# Patient Record
Sex: FEMALE | Race: WHITE | Employment: OTHER | URBAN - NONMETROPOLITAN AREA
[De-identification: names, ages, dates, MRNs, and addresses within clinical notes are randomized per-mention and may not be internally consistent; named-entity substitution may affect disease eponyms.]

---

## 2018-02-21 ENCOUNTER — HOSPITAL ENCOUNTER (OUTPATIENT)
Dept: LAB | Age: 53
Discharge: HOME | End: 2018-02-21
Attending: FAMILY MEDICINE | Admitting: FAMILY MEDICINE

## 2018-02-21 LAB
ALBUMIN SERPL-MCNC: 3.9 GM/DL (ref 3.4–5)
ALP BLD-CCNC: 85 IU/L (ref 40–129)
ALT SERPL-CCNC: 17 U/L (ref 10–40)
ANION GAP SERPL CALCULATED.3IONS-SCNC: 10 MMOL/L (ref 4–16)
AST SERPL-CCNC: 17 IU/L (ref 15–37)
BILIRUB SERPL-MCNC: 0.4 MG/DL (ref 0–1)
BUN BLDV-MCNC: 19 MG/DL (ref 6–23)
CALCIUM SERPL-MCNC: 9.6 MG/DL (ref 8.3–10.6)
CHLORIDE BLD-SCNC: 98 MMOL/L (ref 99–110)
CO2: 31 MMOL/L (ref 21–32)
CREAT SERPL-MCNC: 0.6 MG/DL (ref 0.6–1.1)
GFR AFRICAN AMERICAN: >60 ML/MIN/1.73M2
GFR NON-AFRICAN AMERICAN: >60 ML/MIN/1.73M2
GLUCOSE FASTING: 101 MG/DL (ref 70–99)
POTASSIUM SERPL-SCNC: 3.7 MMOL/L (ref 3.5–5.1)
SODIUM BLD-SCNC: 139 MMOL/L (ref 135–145)
TOTAL PROTEIN: 7.8 GM/DL (ref 6.4–8.2)
TSH HIGH SENSITIVITY: 0.19 UIU/ML (ref 0.27–4.2)

## 2018-02-22 LAB
T3 UPTAKE PERCENT: 39
T4 TOTAL: 9.29 UG/DL

## 2018-02-25 LAB
VITAMIN D2 AND D3, TOTAL: 23.6
VITAMIN D2, 25 HYDROXY: <1
VITAMIN D3,25 HYDROXY: 23.6

## 2019-01-23 ENCOUNTER — OFFICE VISIT (OUTPATIENT)
Dept: ORTHOPEDIC SURGERY | Age: 54
End: 2019-01-23
Payer: COMMERCIAL

## 2019-01-23 VITALS
RESPIRATION RATE: 14 BRPM | OXYGEN SATURATION: 95 % | BODY MASS INDEX: 41.15 KG/M2 | WEIGHT: 247 LBS | HEIGHT: 65 IN | HEART RATE: 105 BPM

## 2019-01-23 DIAGNOSIS — R52 PAIN: ICD-10-CM

## 2019-01-23 DIAGNOSIS — M17.11 PRIMARY OSTEOARTHRITIS OF RIGHT KNEE: Primary | ICD-10-CM

## 2019-01-23 PROCEDURE — 99203 OFFICE O/P NEW LOW 30 MIN: CPT | Performed by: ORTHOPAEDIC SURGERY

## 2019-01-23 PROCEDURE — 20610 DRAIN/INJ JOINT/BURSA W/O US: CPT | Performed by: ORTHOPAEDIC SURGERY

## 2019-01-23 ASSESSMENT — ENCOUNTER SYMPTOMS
COLOR CHANGE: 0
BACK PAIN: 0

## 2019-03-06 ENCOUNTER — OFFICE VISIT (OUTPATIENT)
Dept: ORTHOPEDIC SURGERY | Age: 54
End: 2019-03-06
Payer: COMMERCIAL

## 2019-03-06 VITALS — WEIGHT: 245 LBS | BODY MASS INDEX: 46.26 KG/M2 | RESPIRATION RATE: 16 BRPM | HEIGHT: 61 IN

## 2019-03-06 DIAGNOSIS — M17.12 PRIMARY OSTEOARTHRITIS OF LEFT KNEE: Primary | ICD-10-CM

## 2019-03-06 PROCEDURE — G8427 DOCREV CUR MEDS BY ELIG CLIN: HCPCS | Performed by: ORTHOPAEDIC SURGERY

## 2019-03-06 PROCEDURE — 4004F PT TOBACCO SCREEN RCVD TLK: CPT | Performed by: ORTHOPAEDIC SURGERY

## 2019-03-06 PROCEDURE — 99212 OFFICE O/P EST SF 10 MIN: CPT | Performed by: ORTHOPAEDIC SURGERY

## 2019-03-06 PROCEDURE — G8484 FLU IMMUNIZE NO ADMIN: HCPCS | Performed by: ORTHOPAEDIC SURGERY

## 2019-03-06 PROCEDURE — 3017F COLORECTAL CA SCREEN DOC REV: CPT | Performed by: ORTHOPAEDIC SURGERY

## 2019-03-06 PROCEDURE — G8417 CALC BMI ABV UP PARAM F/U: HCPCS | Performed by: ORTHOPAEDIC SURGERY

## 2019-03-06 ASSESSMENT — ENCOUNTER SYMPTOMS
COLOR CHANGE: 0
BACK PAIN: 0

## 2019-03-13 ENCOUNTER — TELEPHONE (OUTPATIENT)
Dept: ORTHOPEDIC SURGERY | Age: 54
End: 2019-03-13

## 2019-06-05 ENCOUNTER — OFFICE VISIT (OUTPATIENT)
Dept: ORTHOPEDIC SURGERY | Age: 54
End: 2019-06-05
Payer: COMMERCIAL

## 2019-06-05 DIAGNOSIS — M17.12 PRIMARY OSTEOARTHRITIS OF LEFT KNEE: Primary | ICD-10-CM

## 2019-06-05 PROCEDURE — 4004F PT TOBACCO SCREEN RCVD TLK: CPT | Performed by: ORTHOPAEDIC SURGERY

## 2019-06-05 PROCEDURE — G8417 CALC BMI ABV UP PARAM F/U: HCPCS | Performed by: ORTHOPAEDIC SURGERY

## 2019-06-05 PROCEDURE — 3017F COLORECTAL CA SCREEN DOC REV: CPT | Performed by: ORTHOPAEDIC SURGERY

## 2019-06-05 PROCEDURE — 99213 OFFICE O/P EST LOW 20 MIN: CPT | Performed by: ORTHOPAEDIC SURGERY

## 2019-06-05 PROCEDURE — G8428 CUR MEDS NOT DOCUMENT: HCPCS | Performed by: ORTHOPAEDIC SURGERY

## 2019-06-05 PROCEDURE — 20610 DRAIN/INJ JOINT/BURSA W/O US: CPT | Performed by: ORTHOPAEDIC SURGERY

## 2019-06-05 ASSESSMENT — ENCOUNTER SYMPTOMS
COLOR CHANGE: 0
BACK PAIN: 0

## 2019-06-05 NOTE — PATIENT INSTRUCTIONS
Patient Education        Knee Arthritis: Exercises  Your Care Instructions  Here are some examples of exercises for knee arthritis. Start each exercise slowly. Ease off the exercise if you start to have pain. Your doctor or physical therapist will tell you when you can start these exercises and which ones will work best for you. How to do the exercises  Knee flexion with heel slide    1. Lie on your back with your knees bent. 2. Slide your heel back by bending your affected knee as far as you can. Then hook your other foot around your ankle to help pull your heel even farther back. 3. Hold for about 6 seconds, then rest for up to 10 seconds. 4. Repeat 8 to 12 times. 5. Switch legs and repeat steps 1 through 4, even if only one knee is sore. Quad sets    1. Sit with your affected leg straight and supported on the floor or a firm bed. Place a small, rolled-up towel under your knee. Your other leg should be bent, with that foot flat on the floor. 2. Tighten the thigh muscles of your affected leg by pressing the back of your knee down into the towel. 3. Hold for about 6 seconds, then rest for up to 10 seconds. 4. Repeat 8 to 12 times. 5. Switch legs and repeat steps 1 through 4, even if only one knee is sore. Straight-leg raises to the front    1. Lie on your back with your good knee bent so that your foot rests flat on the floor. Your affected leg should be straight. Make sure that your low back has a normal curve. You should be able to slip your hand in between the floor and the small of your back, with your palm touching the floor and your back touching the back of your hand. 2. Tighten the thigh muscles in your affected leg by pressing the back of your knee flat down to the floor. Hold your knee straight. 3. Keeping the thigh muscles tight and your leg straight, lift your affected leg up so that your heel is about 12 inches off the floor. Hold for about 6 seconds, then lower slowly.   4. Relax for up to 10 seconds between repetitions. 5. Repeat 8 to 12 times. 6. Switch legs and repeat steps 1 through 5, even if only one knee is sore. Active knee flexion    1. Lie on your stomach with your knees straight. If your kneecap is uncomfortable, roll up a washcloth and put it under your leg just above your kneecap. 2. Lift the foot of your affected leg by bending the knee so that you bring the foot up toward your buttock. If this motion hurts, try it without bending your knee quite as far. This may help you avoid any painful motion. 3. Slowly move your leg up and down. 4. Repeat 8 to 12 times. 5. Switch legs and repeat steps 1 through 4, even if only one knee is sore. Quadriceps stretch (facedown)    1. Lie flat on your stomach, and rest your face on the floor. 2. Wrap a towel or belt strap around the lower part of your affected leg. Then use the towel or belt strap to slowly pull your heel toward your buttock until you feel a stretch. 3. Hold for about 15 to 30 seconds, then relax your leg against the towel or belt strap. 4. Repeat 2 to 4 times. 5. Switch legs and repeat steps 1 through 4, even if only one knee is sore. Stationary exercise bike    1. If you do not have a stationary exercise bike at home, you can find one to ride at your local health club or community center. 2. Adjust the height of the bike seat so that your knee is slightly bent when your leg is extended downward. If your knee hurts when the pedal reaches the top, you can raise the seat so that your knee does not bend as much. 3. Start slowly. At first, try to do 5 to 10 minutes of cycling with little to no resistance. Then increase your time and the resistance bit by bit until you can do 20 to 30 minutes without pain. 4. If you start to have pain, rest your knee until your pain gets back to the level that is normal for you. Or cycle for less time or with less effort.     Follow-up care is a key part of your treatment and safety. Be sure to make and go to all appointments, and call your doctor if you are having problems. It's also a good idea to know your test results and keep a list of the medicines you take. Where can you learn more? Go to https://chfifieb.Evolver. org and sign in to your [employer] account. Enter C159 in the O-film box to learn more about \"Knee Arthritis: Exercises. \"     If you do not have an account, please click on the \"Sign Up Now\" link. Current as of: September 20, 2018  Content Version: 12.0  © 4886-4265 [employer], [employer]. Care instructions adapted under license by [facility] ([facility]). If you have questions about a medical condition or this instruction, always ask your healthcare professional. [address] any warranty or liability for your use of this information.        steroid injection left knee   follow up in 6 weeks

## 2019-06-05 NOTE — PROGRESS NOTES
Patient is still having left knee pain. She states the cortisone injection in January only helped a couple days. Pain is worse in the first steps of the morning and last steps of the day. She complains of popping and crackling. She has some locking. She has tried ibuprofen (nsaids) for about 7 months, she has tried salon pas, biofreeze, heat, ice and epsom salt baths. She has done home exercises. She does have stairs at home that are painful to go up. She has not had any prior surgeries or fractures in the left leg.

## 2019-06-05 NOTE — PROGRESS NOTES
Subjective:      Patient ID: Lieutenant Davis is a 53 y.o. female. Patient is still having left knee pain. She states the cortisone injection in January only helped a couple days. Pain is worse in the first steps of the morning and last steps of the day. She complains of popping and crackling. She has some locking. She has tried ibuprofen (nsaids) for about 7 months, she has tried salon pas, biofreeze, heat, ice and epsom salt baths. She has done home exercises. She does have stairs at home that are painful to go up. She has not had any prior surgeries or fractures in the left leg. She comes in today for recheck of her left knee pain and follow-up after cortisone injection. She states that since the last injection she has continued to have deep, aching and grinding pain globally in her left knee. She states that the joint injection only helped her for a couple of weeks. She continues to have deep, aching and grinding pain globally in her left knee. Patient denies any new injury to the involved extremity/ joint, denies numbness or tingling in the involved extremity and denies fever or chills. The request for the Orthovisc injections was denied. Knee Pain    Pertinent negatives include no numbness. Review of Systems   Constitutional: Negative for activity change, chills and fever. Musculoskeletal: Positive for arthralgias, gait problem, joint swelling and myalgias. Negative for back pain. Skin: Negative for color change, pallor, rash and wound. Neurological: Negative for weakness and numbness. Objective:   Physical Exam   Constitutional: She is oriented to person, place, and time. Vital signs are normal. She appears well-developed and well-nourished. HENT:   Head: Normocephalic and atraumatic. Eyes: Pupils are equal, round, and reactive to light. Neck: Normal range of motion. Musculoskeletal: Normal range of motion. She exhibits edema and tenderness.  She exhibits no deformity. Right hip: Normal.        Left hip: Normal.        Right knee: She exhibits normal range of motion, no swelling, no effusion, no ecchymosis, no deformity, no laceration, no erythema, normal alignment, no LCL laxity, normal patellar mobility, no bony tenderness and no MCL laxity. No tenderness found. No medial joint line, no lateral joint line, no MCL, no LCL and no patellar tendon tenderness noted. Left knee: She exhibits swelling, effusion, bony tenderness and abnormal meniscus. She exhibits normal range of motion, no ecchymosis, no deformity, no laceration, no erythema, normal alignment, no LCL laxity, normal patellar mobility and no MCL laxity. Tenderness found. Medial joint line and lateral joint line tenderness noted. No MCL, no LCL and no patellar tendon tenderness noted. Neurological: She is alert and oriented to person, place, and time. She has normal strength. No sensory deficit. Skin: Skin is warm and dry. No rash noted. No erythema. No pallor. Left knee-skin intact with no erythema, ecchymosis or lacerations present. Mild joint effusion. Positive Babatunde sign in the medial compartment of the left knee. XRAY  X-ray 3 view of the Left knee obtained and reviewed by me today in the office demonstrates age-appropriate bone density throughout, moderate to severe degenerative changes with medial joint space collapse and moderate patellofemoral joint space narrowing, normal tracking of the patella, no acute osseous abnormalities, no bony prominences, no loose bodies. Assessment:      Left knee DJD, moderate to severe      Plan:      I discussed with her today her response to the cortisone injection for her left knee. At this point given her persistent symptoms the next step would be to consider Orthovisc injections for her left knee. At this point we will attempt an additional cortisone injection today. She agrees and would like to proceed with the injection.   I will also get her set up for some aquatic physical therapy. If these ever quit working in the last resort would be to consider knee replacement surgery. Continue weight-bearing as tolerated. Continue range of motion exercises as instructed. Ice and elevate as needed. Tylenol or Motrin for pain. Follow-up in 6 weeks for recheck. Knee Injection Procedure Note    Pre-operative Diagnosis: left knee DJD    Post-operative Diagnosis: same    Indications: Symptom relief from osteoarthritis    Anesthesia: Lidocaine 1% and marcaine 0.5% without epinephrine without added sodium bicarbonate    Procedure Details     Verbal consent was obtained for the procedure. The joint was prepped with alcohol. A 22 gauge needle was inserted into the superior aspect of the joint from a lateral approach. 1 ml 1% lidocaine and 1 ml 0.5% marcaine and 1 ml of triamcinolone (KENALOG) 40mg/ml was then injected into the joint through the same needle. The needle was removed and the area cleansed and dressed. Complications:  None; patient tolerated the procedure well. Pain improved immediately after injection.               Brendan 97, DO

## 2019-06-26 ENCOUNTER — HOSPITAL ENCOUNTER (OUTPATIENT)
Dept: PHYSICAL THERAPY | Age: 54
Discharge: HOME | End: 2019-06-26

## 2019-06-27 ENCOUNTER — HOSPITAL ENCOUNTER (OUTPATIENT)
Dept: PHYSICAL THERAPY | Age: 54
Setting detail: THERAPIES SERIES
Discharge: HOME | End: 2019-06-27
Payer: COMMERCIAL

## 2019-06-27 PROCEDURE — 97140 MANUAL THERAPY 1/> REGIONS: CPT

## 2019-06-27 PROCEDURE — 97110 THERAPEUTIC EXERCISES: CPT

## 2019-06-27 PROCEDURE — 97162 PT EVAL MOD COMPLEX 30 MIN: CPT

## 2019-06-27 NOTE — PLAN OF CARE
Outpatient Physical Therapy        [x] Phone: [phone]   Fax: [phone]   Pediatric Therapy          [] Phone: [phone]   Fax: [phone]  Pediatric [facility]          [] Phone: [phone]   Fax: [phone]      To: Referring Practitioner: Dr. Rashawn Hodge    From: [facility], PT     Patient: Jazmine Leong       : 1965  Diagnosis: L knee OA   Treatment Diagnosis: Knee DJD/OA   Date: 2019    Physical Therapy Certification/Re-Certification Form  Dear Dr. Rashawn Hodge  The following patient has been evaluated for physical therapy services and for therapy to continue, Please review the attached evaluation and/or summary of the patient's plan of care, and verify that you agree therapy should continue by signing the attached document and sending it back to our office. Patient is a  54 yo female who presents with L knee pain which impacts on adls;patient's goal is to decrease pain ;patient reports that pain limits activities including standing, walking, steps, sleeping; PT to address patient's goals, impairments and activity limitations with skilled interventions checked in plan of care;patient's level of function prior to onset of pain was independent; did not observe any barriers to learning during PT eval; learning preferences include demonstration, practice, and handouts; patient expressed understanding of HEP; patient appears to be motivated to participate in an active PT program and to be compliant with HEP expectations;patient assisted in developing treatment plan and goals; no DME is currently being used;      Current functional level (based on LEFS )   :    Assessment:  Pt relates history of chronic pains, knee onset in 2018 when descending and pivoting on steps. She relates 2 steroid injections have not brought relief, Orthovisc denied at this time. She presents with pain limited ROM and strength. Tenderness at trochanter, medial joint line, ITB, pes anserine.   Palpable crepitus at knees      Plan of Care/Treatment to date:  [x] Therapeutic Exercise    [x] Aquatics:  [x] Therapeutic Activity    [] Ultrasound  [] Elec Stimulation  [x] Gait Training     [] Cervical Traction [] Lumbar Traction  [x] Neuromuscular Re-education [] Cold/hotpack [] Iontophoresis   [x] Instruction in HEP       [x] Manual Therapy     [] vasopneumatic            [] Self care home management        [x]Dry needling trigger point point/pain management    ? Frequency/Duration:  # Days per week: [] 1 day # Weeks: [] 1 week [] 5 weeks     [x] 2 days? [] 2 weeks [x] 6 weeks     [] 3 days   [] 3 weeks [] 7 weeks     [] 4 days   [] 4 weeks [] 8 weeks    Rehab Potential/Progress: [] Excellent [x] Good [] Fair  [] Poor     Goals:       Long term goals  Time Frame for Long term goals : 6 weeks  Long term goal 1: I in home program.  Long term goal 2: up/down one floor of steps with minimal pain. Long term goal 3: knee arom 0-120  Long term goal 4: mmt 4+ or better L knee flex, extension. Long term goal 5: stand/walk 1 hr with manageable pain. Electronically signed by:  Kashmir Eli PT, 6/27/2019, 10:24 AM              If you have any questions or concerns, please don't hesitate to call.   Thank you for your referral.      Physician Signature:_________________Date:____________Time: ________  By signing above, therapists plan is approved by physician

## 2019-06-27 NOTE — PROGRESS NOTES
Physical Therapy  Initial Assessment  Date: 2019  Patient Name: Jamila Luo  MRN: [id number]  : 1965     Treatment Diagnosis: Knee DJD/OA    Restrictions       Subjective   General  Additional Pertinent Hx: L knee pain has progressively worsened since 2018 going down steps. 2 steroid injections without lasting benefit. Orthovisc denied. has tried numerous topicals without benefit. Ibuprofin and herbals  Referring Practitioner: Dr. Breana Dalal  Referral Date : 19  Diagnosis: L knee OA  PT Visit Information  PT Insurance Information: Lilly  Subjective  Subjective: L knee pain  Pain Screening  Patient Currently in Pain: Yes  Pain Assessment  Pain Assessment: 0-10  Vital Signs  Patient Currently in Pain: Yes    Vision/Hearing  Vision  Vision: Within Functional Limits  Hearing  Hearing: Within functional limits    Orientation  Orientation  Overall Orientation Status: Within Normal Limits    Social/Functional History  Social/Functional History  Lives With: Daughter  Type of Home: House  Home Layout: Two level  Home Access: Stairs to enter with rails(descends backwards)  Homemaking Responsibilities: Yes  Ambulation Assistance: Independent  Transfer Assistance: Independent  Active : Yes  Occupation: Unemployed  Type of occupation:     Objective          AROM LLE (degrees)  LLE General AROM: 0-90 flexion. Pain limited    Strength LLE  Comment: pain limited 4/5     Additional Measures  Special Tests: neg instability tests, relates pain wtih all. Other: Tenderness at ITB, greater trochanters, medial joint line, pes anserine. Assessment   Conditions Requiring Skilled Therapeutic Intervention  Assessment: Pt relates history of chronic pains, knee onset in 2018 when descending and pivoting on steps. She relates 2 steroid injections have not brought relief, Orthovisc denied at this time.   She presents with pain limited ROM and strength. Tenderness at trochanter, medial joint line, ITB, pes anserine. Palpable crepitus at knees. Treatment Diagnosis: Knee DJD/OA  REQUIRES PT FOLLOW UP: Yes         Plan   Plan  Times per week: 2x    Goals  Long term goals  Time Frame for Long term goals : 6 weeks  Long term goal 1: I in home program.  Long term goal 2: up/down one floor of steps with minimal pain. Long term goal 3: knee arom 0-120  Long term goal 4: mmt 4+ or better L knee flex, extension. Long term goal 5: stand/walk 1 hr with manageable pain.    Patient Goals   Patient goals : decrease pain          Zachary Montana, PT

## 2019-06-27 NOTE — FLOWSHEET NOTE
Outpatient Physical Therapy  [place]           [x] Phone: [phone]   Fax: [phone]  [facility]           [] Phone: [phone]   Fax: [phone]        Physical Therapy Daily Treatment Note  Date:  2019    Patient Name:  Jazmine Leong    :  1965  MRN: [id number]  Restrictions/Precautions:    Diagnosis:   Diagnosis: L knee OA  Date of Injury/Surgery:   Treatment Diagnosis: Treatment Diagnosis: Knee DJD/OA    Insurance/Certification information: PT Insurance Information: [address]   Referring Physician:  Referring Practitioner: Dr. Rashawn Hodge  Next Doctor Visit:    Plan of care signed (Y/N):  n  Outcome Measure:   Visit# / total visits:   initially  Pain level: 6-7/10   Goals:          Long term goals  Time Frame for Long term goals : 6 weeks  Long term goal 1: I in home program.  Long term goal 2: up/down one floor of steps with minimal pain. Long term goal 3: knee arom 0-120  Long term goal 4: mmt 4+ or better L knee flex, extension. Long term goal 5: stand/walk 1 hr with manageable pain. Summary of Evaluation Assessment: Pt relates history of chronic pains, knee onset in 2018 when descending and pivoting on steps. She relates 2 steroid injections have not brought relief, Orthovisc denied at this time. She presents with pain limited ROM and strength. Tenderness at trochanter, medial joint line, ITB, pes anserine. Palpable crepitus at knees. Subjective:  See eval         Any changes in Ambulatory Summary Sheet?   None        Objective:  See eval     Exercises:  Exercise/Equipment Date Date Date           WARM UP                     TABLE                                       STANDING                                                     PROPRIOCEPTION                                    MODALITIES                        Other Therapeutic Activities/Education:        Home Exercise Program:  SLR, QS, Corazon      Manual Treatments:  dnt 40mm to vmo, saphenous, tibial, common fib, 60 mm to itb. Modalities:        Communication with other providers:        Assessment:  Assessment: Pt relates history of chronic pains, knee onset in 12/2018 when descending and pivoting on steps. She relates 2 steroid injections have not brought relief, Orthovisc denied at this time. She presents with pain limited ROM and strength. Tenderness at trochanter, medial joint line, ITB, pes anserine. Palpable crepitus at knees.      End session pain: /10      Plan for Next Session:  begin aquatic therapy      Time In / Time Out:     0900/0950      Timed Code/Total Treatment Minutes:      25/50      Next Progress Note due:        Plan of Care Interventions:  [] Therapeutic Exercise  [] Modalities:  [] Therapeutic Activity     [] Ultrasound  [] Estim  [] Gait Training      [] Cervical Traction [] Lumbar Traction  [] Neuromuscular Re-education    [] Cold/hotpack [] Iontophoresis   [] Instruction in HEP      [] Vasopneumatic   [x] Dry Needling    [] Manual Therapy               [x] Aquatic Therapy              Electronically signed by:  Andrea Gutierrez 6/27/2019, 10:27 AM

## 2019-06-28 ENCOUNTER — HOSPITAL ENCOUNTER (OUTPATIENT)
Dept: PHYSICAL THERAPY | Age: 54
Setting detail: THERAPIES SERIES
Discharge: HOME | End: 2019-06-28
Payer: COMMERCIAL

## 2019-06-28 PROCEDURE — 97113 AQUATIC THERAPY/EXERCISES: CPT

## 2019-06-28 NOTE — FLOWSHEET NOTE
Physical Therapy Aquatic Flow Sheet   Date:  2019    Patient Name:  González Herman    :  1965  Restrictions/Precautions:    Diagnosis:    L knee OA   Treatment Diagnosis:    Knee DJD/OA   Insurance/Certification information:  [facility]  Referring Physician:    Dr. Dolores Goodwin            Any changes in Ambulatory Summary Sheet?:  Plan of care signed (Y/N):    Visit# / total visits:  2/5  Pain level: 5-6/10   Electronically signed by:  Patel Hightower PTA    Subjective: Patient reports of 5-6/10 pain upon arrival and reports after the dry needling her leg felt more relaxed.     Key  B= Belt DB= Dumbells T= Theratube   H= Hydrotone N= Noodles W= Weights   P= Paddles S= Speedo equipment K= Kickboard     Exercises/Activities   Warm-up/Amb    Exercises      Slow forward   5'  HR/TR  2x10 B    Slow sideways  5'  Marches  20x B    Slow backwards  5'  Mini-squats  x    Medium forward    4-way SLR  20x B ea way    Medium sideways    Hip circles/fig 8      Small shuffle    Hamstring curls  x    Jog    Knee extension      Braiding    Pelvic tilts      Bicycling    Scap squeezes          Shoulder flex/ext      Functional    Shoulder abd/add      Step    Shoulder H. abd/add      Lifting    Shoulder IR/ER      Hand to opp knee    Rowing      Push down squat    Bilateral pull down      UE PNF    Push/pull      LE PNF    Push downs      Wall push ups    Arm circles      SLS    Elbow flex/ext          Chin tuck      Stretching    UT shrugs/rolls      Gastroc/Soleus  2x30\" B  Rocking horse      Hamstring  2x30\" B        SKTC    Other      Piriformis          Hip flexor          Ladder pull          Pec stretch          Post deltoid           Treatment Included:  [] Therapeutic Exercise   [] Instruction in HEP  [] Group   [] Therapeutic Activity      [] Neuromuscular Re-education [x] Aquatic   [] Gait             [] Manual  Other:  Time In/ Time Out: 4316/4381    Timed Code Treatment Minutes:  60aqua    Total Treatment Minutes:  60    Objective Findings:  Entered and exited the pool via stairs    Communication with other providers:    Education to Patient:    Adverse Reaction to Treatment:    Assessment:  5-6/10 pain at end of session   Noted patient several times floating on her back, and stopping during treatment to bend and straighten her knee.     Treatment/Activity Tolerance:  [x] Patient tolerated treatment well [] Patient limited by fatique  [] Patient limited by pain [] Patient limited by other medical complications  [] Other:     Prognosis: [] Good [] Fair  [] Poor    Patient Requires Follow-up: [] Yes  [] No    Plan:   [x] Continue per plan of care [] Alter current plan (see comments)  [] Plan of care initiated [] Hold pending MD visit [] Discharge    See Weekly Progress Note:    [] Yes [] No Next due:        Electronically signed by:  Patel Hightower PTA  6/28/2019, 7:46 AM

## 2019-07-02 ENCOUNTER — HOSPITAL ENCOUNTER (OUTPATIENT)
Dept: PHYSICAL THERAPY | Age: 54
Setting detail: THERAPIES SERIES
Discharge: HOME | End: 2019-07-02
Payer: COMMERCIAL

## 2019-07-02 PROCEDURE — 97113 AQUATIC THERAPY/EXERCISES: CPT

## 2019-07-09 ENCOUNTER — HOSPITAL ENCOUNTER (OUTPATIENT)
Dept: PHYSICAL THERAPY | Age: 54
Setting detail: THERAPIES SERIES
Discharge: HOME | End: 2019-07-09
Payer: COMMERCIAL

## 2019-07-09 PROCEDURE — 97113 AQUATIC THERAPY/EXERCISES: CPT

## 2019-07-09 NOTE — FLOWSHEET NOTE
Gait             [] Manual  Other:    Time In/ Time Out: 1423-8716    Timed Code Treatment Minutes:  43' aqua    Total Treatment Minutes:  43'    Objective Findings:  Entered and exited the pool via stairs    Communication with other providers:    Education to Patient:    Adverse Reaction to Treatment:    Assessment: Patient did not need to exit the pool early due to stomach upset this date. She noted that she was feeling better from the heat of the pool after treatment, but her pain remained the same. Hip   Patient reports she wishes to continue with the aquatic therapy appointments at this time for pain management, improved flexibility and improved strength. Treatment/Activity Tolerance:  [x] Patient tolerated treatment well [] Patient limited by fatique  [] Patient limited by pain [] Patient limited by other medical complications  [] Other:     Prognosis: [] Good [] Fair  [] Poor    Patient Requires Follow-up: [] Yes  [] No    Plan:   [x] Continue per plan of care [] Alter current plan (see comments)  [] Plan of care initiated [] Hold pending MD visit [] Discharge    See Weekly Progress Note:    [] Yes [] No Next due:        Electronically signed by:  Kailyn Cervantes, SIN    7/9/2019, 10:41 AM

## 2019-07-15 ENCOUNTER — HOSPITAL ENCOUNTER (OUTPATIENT)
Dept: PHYSICAL THERAPY | Age: 54
Setting detail: THERAPIES SERIES
Discharge: HOME | End: 2019-07-15
Payer: COMMERCIAL

## 2019-07-15 PROCEDURE — 97113 AQUATIC THERAPY/EXERCISES: CPT

## 2019-07-15 NOTE — FLOWSHEET NOTE
Physical Therapy Aquatic Flow Sheet   Date:  7/15/2019    Patient Name:  Delaney Knutson    :  1965  Restrictions/Precautions:    Diagnosis:    L knee OA   Treatment Diagnosis:    Knee DJD/OA   Insurance/Certification information:  [facility]  Referring Physician:    Dr. Jacy Dsouza            Any changes in Ambulatory Summary Sheet?:  Plan of care signed (Y/N):    Visit# / total visits:    Pain level: 5/10   Electronically signed by:  Lillian Stuart PT    Subjective: feels better in the pool and that day. It is always stiff and sore in the mornings.         Key  B= Belt DB= Dumbells T= Theratube   H= Hydrotone N= Noodles W= Weights   P= Paddles S= Speedo equipment K= Kickboard     Exercises/Activities   Warm-up/Amb    Exercises      Slow forward   5'  HR/TR  20 B    Slow sideways  5'  Marches  20x B    Slow backwards  5'  Mini-squats      Medium forward    4-way SLR  12x B ea way    Medium sideways    Hip circles/fig 8      Small shuffle    Hamstring curls  20x B    Jog    Knee extension      Braiding    Pelvic tilts      Bicycling    Scap squeezes          Shoulder flex/ext      Functional    Shoulder abd/add      Step    Shoulder H. abd/add      Lifting    Shoulder IR/ER      Hand to opp knee    Rowing      Push down squat    Bilateral pull down      UE PNF    Push/pull      LE PNF    Push downs      Wall push ups    Arm circles      SLS    Elbow flex/ext          Chin tuck      Stretching    UT shrugs/rolls      Gastroc/Soleus  2x30\" B  Rocking horse      Hamstring  2x30\" B        SKTC    Other      Piriformis    5ft B hip abd/add 30 sec    Hip flexor    5ft Flex/ext B LE 30 sec    Ladder pull   5ft bicycle    Pec stretch          Post deltoid           Treatment Included:  [] Therapeutic Exercise   [] Instruction in HEP  [] Group   [] Therapeutic Activity      [] Neuromuscular Re-education [x] Aquatic   [] Gait             [] Manual  Other:    Time In/ Time Out: 1018/    Timed Code Treatment

## 2019-07-17 ENCOUNTER — HOSPITAL ENCOUNTER (OUTPATIENT)
Dept: PHYSICAL THERAPY | Age: 54
Discharge: HOME | End: 2019-07-17

## 2019-07-17 ENCOUNTER — TELEPHONE (OUTPATIENT)
Dept: ORTHOPEDIC SURGERY | Age: 54
End: 2019-07-17

## 2019-07-17 ENCOUNTER — OFFICE VISIT (OUTPATIENT)
Dept: ORTHOPEDIC SURGERY | Age: 54
End: 2019-07-17
Payer: COMMERCIAL

## 2019-07-17 DIAGNOSIS — M17.12 PRIMARY OSTEOARTHRITIS OF LEFT KNEE: Primary | ICD-10-CM

## 2019-07-17 PROCEDURE — G8428 CUR MEDS NOT DOCUMENT: HCPCS | Performed by: ORTHOPAEDIC SURGERY

## 2019-07-17 PROCEDURE — G8417 CALC BMI ABV UP PARAM F/U: HCPCS | Performed by: ORTHOPAEDIC SURGERY

## 2019-07-17 PROCEDURE — 4004F PT TOBACCO SCREEN RCVD TLK: CPT | Performed by: ORTHOPAEDIC SURGERY

## 2019-07-17 PROCEDURE — 99212 OFFICE O/P EST SF 10 MIN: CPT | Performed by: ORTHOPAEDIC SURGERY

## 2019-07-17 PROCEDURE — 3017F COLORECTAL CA SCREEN DOC REV: CPT | Performed by: ORTHOPAEDIC SURGERY

## 2019-07-21 ASSESSMENT — ENCOUNTER SYMPTOMS
BACK PAIN: 0
COLOR CHANGE: 0

## 2019-07-23 ENCOUNTER — HOSPITAL ENCOUNTER (OUTPATIENT)
Dept: PHYSICAL THERAPY | Age: 54
Setting detail: THERAPIES SERIES
Discharge: HOME | End: 2019-07-23
Payer: COMMERCIAL

## 2019-07-23 PROCEDURE — 97113 AQUATIC THERAPY/EXERCISES: CPT

## 2019-07-24 ENCOUNTER — HOSPITAL ENCOUNTER (OUTPATIENT)
Dept: PHYSICAL THERAPY | Age: 54
Discharge: HOME | End: 2019-07-24

## 2019-07-31 ENCOUNTER — PROCEDURE VISIT (OUTPATIENT)
Dept: ORTHOPEDIC SURGERY | Age: 54
End: 2019-07-31
Payer: COMMERCIAL

## 2019-07-31 VITALS — BODY MASS INDEX: 46.26 KG/M2 | WEIGHT: 245 LBS | HEIGHT: 61 IN | RESPIRATION RATE: 14 BRPM

## 2019-07-31 DIAGNOSIS — M17.12 PRIMARY OSTEOARTHRITIS OF LEFT KNEE: Primary | ICD-10-CM

## 2019-07-31 PROCEDURE — 20610 DRAIN/INJ JOINT/BURSA W/O US: CPT | Performed by: ORTHOPAEDIC SURGERY

## 2019-07-31 RX ORDER — FUROSEMIDE 10 MG/ML
SOLUTION ORAL DAILY
COMMUNITY

## 2019-08-02 ENCOUNTER — HOSPITAL ENCOUNTER (OUTPATIENT)
Dept: PHYSICAL THERAPY | Age: 54
Setting detail: THERAPIES SERIES
Discharge: HOME | End: 2019-08-02
Payer: COMMERCIAL

## 2019-08-02 PROCEDURE — 97113 AQUATIC THERAPY/EXERCISES: CPT

## 2019-08-02 NOTE — FLOWSHEET NOTE
Physical Therapy Aquatic Flow Sheet   Date:  2019    Patient Name:  Phil Sarkar    :  1965  Restrictions/Precautions:    Diagnosis:    L knee OA   Treatment Diagnosis:    Knee DJD/OA   Insurance/Certification information:  [facility]  Referring Physician:    Dr. Jess Blue            Any changes in Ambulatory Summary Sheet?:  Plan of care signed (Y/N):    Visit# / total visits:   Pain level:  5/10   Electronically signed by:  Alice Cortes PTA    Subjective:  Patient reports of 510 pain upon arrival and reports she got a gel injection in her knee on Wednesday nad goes the next 2  as well .         Key  B= Belt DB= Dumbells T= Theratube   H= Hydrotone N= Noodles W= Weights   P= Paddles S= Speedo equipment K= Kickboard     Exercises/Activities   Warm-up/Amb    Exercises      Slow forward   5'  HR/TR  20 B    Slow sideways  5'  Marches  20x B    Slow backwards  5'  Mini-squats      Medium forward    4-way SLR  20x B ea way    Medium sideways    Hip circles/fig 8      Small shuffle    Hamstring curls  20x B    Jog    Knee extension      Braiding    Pelvic tilts      Bicycling    Scap squeezes          Shoulder flex/ext      Functional    Shoulder abd/add      Step    Shoulder H. abd/add      Lifting    Shoulder IR/ER      Hand to opp knee    Rowing      Push down squat    Bilateral pull down      UE PNF    Push/pull      LE PNF    Push downs      Wall push ups    Arm circles      SLS    Elbow flex/ext          Chin tuck      Stretching    UT shrugs/rolls      Gastroc/Soleus  2x30\" B  Rocking horse      Hamstring  2x30\" B        SKTC    Other      Piriformis    5ft B hip abd/add 1'    Hip flexor    5ft Flex/ext B LE 1'    Ladder pull   5ft Bicycle 1'    Pec stretch          Post deltoid           Treatment Included:  [] Therapeutic Exercise   [] Instruction in HEP  [] Group   [] Therapeutic Activity      [] Neuromuscular Re-education [x] Aquatic   [] Gait             []

## 2019-08-04 ASSESSMENT — ENCOUNTER SYMPTOMS
BACK PAIN: 0
COLOR CHANGE: 0

## 2019-08-07 ENCOUNTER — PROCEDURE VISIT (OUTPATIENT)
Dept: ORTHOPEDIC SURGERY | Age: 54
End: 2019-08-07

## 2019-08-07 VITALS — BODY MASS INDEX: 46.26 KG/M2 | WEIGHT: 245 LBS | HEIGHT: 61 IN | RESPIRATION RATE: 14 BRPM

## 2019-08-07 DIAGNOSIS — M17.12 PRIMARY OSTEOARTHRITIS OF LEFT KNEE: Primary | ICD-10-CM

## 2019-08-11 ASSESSMENT — ENCOUNTER SYMPTOMS
BACK PAIN: 0
COLOR CHANGE: 0

## 2019-08-11 NOTE — PROGRESS NOTES
Eddie Walker returns today for her 2nd Orthovisc injection. She states that after her 1st injection she has been more sore than usual. Pain level 6/10. She states that she had not yet felt relief from the injection.
no erythema, normal alignment, no LCL laxity, normal patellar mobility and no MCL laxity. Tenderness found. Medial joint line and lateral joint line tenderness noted. No MCL, no LCL and no patellar tendon tenderness noted. Neurological: She is alert and oriented to person, place, and time. She has normal strength. No sensory deficit. Skin: Skin is warm and dry. No rash noted. No erythema. No pallor. Left knee-skin intact with no erythema, ecchymosis or lacerations present. Mild joint effusion. Positive Babatunde sign in the medial compartment of the left knee. XRAY  X-ray 3 view of the Left knee reviewed by me today in the office demonstrates age-appropriate bone density throughout, moderate to severe degenerative changes with medial joint space collapse and moderate patellofemoral joint space narrowing, normal tracking of the patella, no acute osseous abnormalities, no bony prominences, no loose bodies. Assessment:      Left knee DJD, moderate to severe      Plan:       I discussed with her today her response to the first Orthovisc injection for her left knee. I did perform her second Orthovisc injection for her left knee today. Continue weight-bearing as tolerated. Continue range of motion exercises as instructed. Ice and elevate as needed. Tylenol or Motrin for pain. Follow up in 1 week for third injection. Knee Injection Procedure Note     Pre-operative Diagnosis: Left knee DJD     Post-operative Diagnosis: same     Indications: Symptom relief from osteoarthritis     Anesthesia: not required     Procedure Details   Verbal consent was obtained for the procedure. The joint was prepped with alcohol. A 22 gauge needle was inserted into the anterior aspect of the joint from a lateral approach. Orthovisc syringe injected. The needle was removed and the area cleansed and dressed. Complications: None; patient tolerated the procedure well.                       Brendan 97, DO

## 2019-08-14 ENCOUNTER — PROCEDURE VISIT (OUTPATIENT)
Dept: ORTHOPEDIC SURGERY | Age: 54
End: 2019-08-14
Payer: COMMERCIAL

## 2019-08-14 VITALS — HEART RATE: 79 BPM | BODY MASS INDEX: 44.21 KG/M2 | OXYGEN SATURATION: 96 % | WEIGHT: 234 LBS

## 2019-08-14 DIAGNOSIS — M17.11 PRIMARY OSTEOARTHRITIS OF RIGHT KNEE: ICD-10-CM

## 2019-08-14 DIAGNOSIS — M17.12 PRIMARY OSTEOARTHRITIS OF LEFT KNEE: Primary | ICD-10-CM

## 2019-08-14 DIAGNOSIS — R52 PAIN: ICD-10-CM

## 2019-08-14 PROCEDURE — 20610 DRAIN/INJ JOINT/BURSA W/O US: CPT | Performed by: ORTHOPAEDIC SURGERY

## 2019-08-14 NOTE — PROGRESS NOTES
Subjective:      Patient ID: Senia Lewis is a 53 y.o. female. Here today for your Monovisc Injection L Knee.      Rishabh Quach MA

## 2019-08-15 ENCOUNTER — HOSPITAL ENCOUNTER (OUTPATIENT)
Dept: PHYSICAL THERAPY | Age: 54
Setting detail: THERAPIES SERIES
Discharge: HOME | End: 2019-08-15
Payer: COMMERCIAL

## 2019-08-15 PROCEDURE — 97113 AQUATIC THERAPY/EXERCISES: CPT

## 2019-08-15 ASSESSMENT — ENCOUNTER SYMPTOMS
BACK PAIN: 0
COLOR CHANGE: 0

## 2019-08-16 ENCOUNTER — HOSPITAL ENCOUNTER (OUTPATIENT)
Dept: PHYSICAL THERAPY | Age: 54
Setting detail: THERAPIES SERIES
Discharge: HOME | End: 2019-08-16
Payer: COMMERCIAL

## 2019-08-16 PROCEDURE — 97113 AQUATIC THERAPY/EXERCISES: CPT

## 2019-08-16 NOTE — FLOWSHEET NOTE
Physical Therapy Aquatic Flow Sheet   Date:  2019    Patient Name:  Brooke Martin    :  1965  Restrictions/Precautions:    Diagnosis:    L knee OA   Treatment Diagnosis:    Knee DJD/OA   Insurance/Certification information:  [facility]  Referring Physician:    Dr. Henry Morocho            Any changes in Ambulatory Summary Sheet?:  Plan of care signed (Y/N):    Visit# / total visits:   Pain level:  410   Electronically signed by:  Bren Welch PTA    Subjective:  Patient continues to have the pain in the inside of the knee. Rates her pain 4/10. Has been feeling better than she did yesterday when she was here. Did not sleep well last night because she could not get her legs comfortable.       Key  B= Belt DB= Dumbells T= Theratube   H= Hydrotone N= Noodles W= Weights   P= Paddles S= Speedo equipment K= Kickboard     Exercises/Activities   Warm-up/Amb    Exercises      Slow forward   5'  HR/TR  20 B    Slow sideways  5'  Marches  20x B    Slow backwards  5'  Mini-squats      Medium forward    4-way SLR  20x B ea way    Medium sideways    Hip circles/fig 8      Small shuffle    Hamstring curls  20x B    Jog    Knee extension      Braiding    Pelvic tilts      Bicycling    Scap squeezes          Shoulder flex/ext      Functional    Shoulder abd/add      Step    Shoulder H. abd/add      Lifting    Shoulder IR/ER      Hand to opp knee    Rowing      Push down squat    Bilateral pull down      UE PNF    Push/pull      LE PNF    Push downs      Wall push ups    Arm circles      SLS    Elbow flex/ext          Chin tuck      Stretching    UT shrugs/rolls      Gastroc/Soleus  2x30\" B  Rocking horse      Hamstring  2x30\" B        SKTC    Other      Piriformis    5ft B hip abd/add 1'    Hip flexor    5ft Flex/ext B LE 1'    Ladder pull   5ft Bicycle 1'    Pec stretch          Post deltoid           Treatment Included:  [] Therapeutic Exercise   [] Instruction in HEP  [] Group   [] Therapeutic Activity

## 2019-08-20 ENCOUNTER — HOSPITAL ENCOUNTER (OUTPATIENT)
Dept: PHYSICAL THERAPY | Age: 54
Setting detail: THERAPIES SERIES
Discharge: HOME | End: 2019-08-20
Payer: COMMERCIAL

## 2019-08-20 PROCEDURE — 97113 AQUATIC THERAPY/EXERCISES: CPT

## 2019-08-20 NOTE — FLOWSHEET NOTE
Code Treatment Minutes:    48aqua    Total Treatment Minutes:   48    Objective Findings:  Slept 2 nights in a row without waking due to pain    Long term goal 1: I in home program.  Long term goal 2: up/down one floor of steps with minimal pain. Long term goal 3: knee arom 0-120  Long term goal 4: mmt 4+ or better L knee flex, extension. Long term goal 5: stand/walk 1 hr with manageable pain. Communication with other providers:    Education to Patient:    Adverse Reaction to Treatment:    Assessment:   Patient reports of 3/10 pain at end of session and did well with exs today.       Treatment/Activity Tolerance:  [x] Patient tolerated treatment well [] Patient limited by fatique  [] Patient limited by pain [] Patient limited by other medical complications  [] Other:     Prognosis: [] Good [] Fair  [] Poor    Patient Requires Follow-up: [] Yes  [] No    Plan:   [x] Continue per plan of care [] Alter current plan (see comments)  [] Plan of care initiated [] Hold pending MD visit [] Discharge    See Weekly Progress Note:    [] Yes [] No Next due:        Electronically signed by:  Elliot Marshall PTA    8/20/2019, 8:45 AM

## 2019-08-21 ENCOUNTER — HOSPITAL ENCOUNTER (OUTPATIENT)
Dept: PHYSICAL THERAPY | Age: 54
Setting detail: THERAPIES SERIES
Discharge: HOME | End: 2019-08-21
Payer: COMMERCIAL

## 2019-08-21 PROCEDURE — 97113 AQUATIC THERAPY/EXERCISES: CPT

## 2019-08-21 NOTE — FLOWSHEET NOTE
In/ Time Out: 1015/1115     Timed Code Treatment Minutes:    60 aqua    Total Treatment Minutes:   60    Objective Findings:      Long term goal 1: I in home program.  Long term goal 2: up/down one floor of steps with minimal pain. Long term goal 3: knee arom 0-120  Long term goal 4: mmt 4+ or better L knee flex, extension. Long term goal 5: stand/walk 1 hr with manageable pain. Communication with other providers:    Education to Patient:    Adverse Reaction to Treatment:    Assessment:   Patient rated her pain 2-3/10 in the left knee after treatment.      Treatment/Activity Tolerance:  [x] Patient tolerated treatment well [] Patient limited by fatique  [] Patient limited by pain [] Patient limited by other medical complications  [] Other:     Prognosis: [] Good [] Fair  [] Poor    Patient Requires Follow-up: [] Yes  [] No    Plan:   [x] Continue per plan of care [] Alter current plan (see comments)  [] Plan of care initiated [] Hold pending MD visit [] Discharge    See Weekly Progress Note:    [] Yes [] No Next due:        Electronically signed by:  Eduardo Lopez PTA    8/21/2019, 10:17 AM

## 2019-08-28 ENCOUNTER — HOSPITAL ENCOUNTER (OUTPATIENT)
Dept: PHYSICAL THERAPY | Age: 54
Setting detail: THERAPIES SERIES
Discharge: HOME | End: 2019-08-28
Payer: COMMERCIAL

## 2019-08-28 PROCEDURE — 97113 AQUATIC THERAPY/EXERCISES: CPT

## 2019-08-28 NOTE — FLOWSHEET NOTE
Physical Therapy Aquatic Flow Sheet   Date:  2019    Patient Name:  Clarissa Bynum    :  1965  Restrictions/Precautions:    Diagnosis:    L knee OA   Treatment Diagnosis:    Knee DJD/OA   Insurance/Certification information:  [facility]  Referring Physician:    Dr. Rosario Roman            Any changes in Ambulatory Summary Sheet?:  Plan of care signed (Y/N):    Visit# / total visits:   Pain level:  310   Electronically signed by:  Lexus Azevedo PTA    Subjective:  Knee is stiff today, but not having a lot of pain. Goals  Long term goals  Time Frame for Long term goals : 6 weeks  Long term goal 1: I in home program  Long term goal 2: up/down one floor of steps with minimal pain. Does this every day with 2-3/10 pain. Does not have the sharp pain like she used to  Long term goal 3: knee arom 0-120  Long term goal 4: mmt 4+ or better L knee flex, extension. Long term goal 5: stand/walk 1 hr with manageable pain.    MET  Patient Goals   Patient goals : decrease pain      Key  B= Belt DB= Dumbells T= Theratube   H= Hydrotone N= Noodles W= Weights   P= Paddles S= Speedo equipment K= Kickboard     Exercises/Activities   Warm-up/Amb    Exercises      Slow forward   5'  HR/TR  20 B    Slow sideways  5'  Marches  20x B    Slow backwards  5'  Mini-squats      Medium forward    4-way SLR  20x B ea way    Medium sideways    Hip circles/fig 8  20x B    Small shuffle    Hamstring curls  20x B    Jog    Knee extension      Braiding    Pelvic tilts      Bicycling    Scap squeezes          Shoulder flex/ext      Functional    Shoulder abd/add      Step    Shoulder H. abd/add      Lifting    Shoulder IR/ER      Hand to opp knee    Rowing      Push down squat    Bilateral pull down      UE PNF    Push/pull      LE PNF    Push downs      Wall push ups    Arm circles      SLS    Elbow flex/ext          Chin tuck      Stretching    UT shrugs/rolls      Gastroc/Soleus  2x30\" B  Rocking horse      Hamstring

## 2019-09-03 ENCOUNTER — HOSPITAL ENCOUNTER (OUTPATIENT)
Dept: PHYSICAL THERAPY | Age: 54
Setting detail: THERAPIES SERIES
Discharge: HOME | End: 2019-09-03
Payer: COMMERCIAL

## 2019-09-03 PROCEDURE — 97113 AQUATIC THERAPY/EXERCISES: CPT

## 2019-09-03 NOTE — FLOWSHEET NOTE
Physical Therapy Aquatic Flow Sheet   Date:  9/3/2019    Patient Name:  Senia Lewis    :  1965  Restrictions/Precautions:    Diagnosis:    L knee OA   Treatment Diagnosis:    Knee DJD/OA   Insurance/Certification information:  [facility]  Referring Physician:    Dr. Ankush Strong            Any changes in Ambulatory Summary Sheet?:  Plan of care signed (Y/N):    Visit# / total visits:   Pain level:  4/10   Electronically signed by:  Chana Lee PTA    Subjective:   Patient reports of 4/10 pain upon arrival and reports the knee is a little creaky today. Goals  Long term goals  Time Frame for Long term goals : 6 weeks  Long term goal 1: I in home program  Long term goal 2: up/down one floor of steps with minimal pain. Does this every day with 2-3/10 pain. Does not have the sharp pain like she used to  Long term goal 3: knee arom 0-120  Long term goal 4: mmt 4+ or better L knee flex, extension. Long term goal 5: stand/walk 1 hr with manageable pain.    MET  Patient Goals   Patient goals : decrease pain      Key  B= Belt DB= Dumbells T= Theratube   H= Hydrotone N= Noodles W= Weights   P= Paddles S= Speedo equipment K= Kickboard     Exercises/Activities   Warm-up/Amb    Exercises      Slow forward   5'  HR/TR  20 B    Slow sideways  5'  Marches  20x B    Slow backwards  5'  Mini-squats      Medium forward    4-way SLR  20x B ea way    Medium sideways    Hip circles/fig 8  20x B    Small shuffle    Hamstring curls  20x B    Jog    Knee extension      Braiding    Pelvic tilts      Bicycling    Scap squeezes          Shoulder flex/ext      Functional    Shoulder abd/add      Step    Shoulder H. abd/add      Lifting    Shoulder IR/ER      Hand to opp knee    Rowing      Push down squat    Bilateral pull down      UE PNF    Push/pull      LE PNF    Push downs      Wall push ups    Arm circles      SLS    Elbow flex/ext          Chin tuck      Stretching    UT shrugs/rolls      Gastroc/Soleus  2x30\" B

## 2019-09-06 ENCOUNTER — HOSPITAL ENCOUNTER (OUTPATIENT)
Dept: PHYSICAL THERAPY | Age: 54
Discharge: HOME | End: 2019-09-06

## 2019-09-10 ENCOUNTER — HOSPITAL ENCOUNTER (OUTPATIENT)
Dept: PHYSICAL THERAPY | Age: 54
Discharge: HOME | End: 2019-09-10

## 2019-09-17 ENCOUNTER — HOSPITAL ENCOUNTER (OUTPATIENT)
Age: 54
Discharge: HOME | End: 2019-09-17
Payer: COMMERCIAL

## 2019-09-17 ENCOUNTER — HOSPITAL ENCOUNTER (OUTPATIENT)
Dept: PHYSICAL THERAPY | Age: 54
Setting detail: THERAPIES SERIES
Discharge: HOME | End: 2019-09-17
Payer: COMMERCIAL

## 2019-09-17 LAB
ALBUMIN SERPL-MCNC: 4.1 GM/DL (ref 3.4–5)
ALP BLD-CCNC: 76 IU/L (ref 40–129)
ALT SERPL-CCNC: 14 U/L (ref 10–40)
ANION GAP SERPL CALCULATED.3IONS-SCNC: 14 MMOL/L (ref 4–16)
AST SERPL-CCNC: 13 IU/L (ref 15–37)
BILIRUB SERPL-MCNC: 0.5 MG/DL (ref 0–1)
BUN BLDV-MCNC: 21 MG/DL (ref 6–23)
CALCIUM SERPL-MCNC: 10 MG/DL (ref 8.3–10.6)
CHLORIDE BLD-SCNC: 97 MMOL/L (ref 99–110)
CHOLESTEROL, FASTING: 163 MG/DL
CO2: 28 MMOL/L (ref 21–32)
CREAT SERPL-MCNC: 0.8 MG/DL (ref 0.6–1.1)
GFR AFRICAN AMERICAN: >60 ML/MIN/1.73M2
GFR NON-AFRICAN AMERICAN: >60 ML/MIN/1.73M2
GLUCOSE FASTING: 111 MG/DL (ref 70–99)
HCT VFR BLD CALC: 45.9 % (ref 37–47)
HDLC SERPL-MCNC: 38 MG/DL
HEMOGLOBIN: 15.5 GM/DL (ref 12.5–16)
IRON: 54 UG/DL (ref 37–145)
LDL CHOLESTEROL DIRECT: 105 MG/DL
MCH RBC QN AUTO: 30.6 PG (ref 27–31)
MCHC RBC AUTO-ENTMCNC: 33.8 % (ref 32–36)
MCV RBC AUTO: 90.5 FL (ref 78–100)
PCT TRANSFERRIN: 22 % (ref 10–44)
PDW BLD-RTO: 14.1 % (ref 11.7–14.9)
PLATELET # BLD: 319 K/CU MM (ref 140–440)
PMV BLD AUTO: 10.7 FL (ref 7.5–11.1)
POTASSIUM SERPL-SCNC: 3.5 MMOL/L (ref 3.5–5.1)
RBC # BLD: 5.07 M/CU MM (ref 4.2–5.4)
SODIUM BLD-SCNC: 139 MMOL/L (ref 135–145)
TOTAL IRON BINDING CAPACITY: 246 UG/DL (ref 250–450)
TOTAL PROTEIN: 8 GM/DL (ref 6.4–8.2)
TRANSFERRIN: 204.7 MG/DL (ref 200–360)
TRIGLYCERIDE, FASTING: 143 MG/DL
TSH HIGH SENSITIVITY: 0.27 UIU/ML (ref 0.27–4.2)
UNSATURATED IRON BINDING CAPACITY: 192 UG/DL (ref 110–370)
WBC # BLD: 12.2 K/CU MM (ref 4–10.5)

## 2019-09-17 PROCEDURE — 97113 AQUATIC THERAPY/EXERCISES: CPT

## 2019-09-17 PROCEDURE — 85027 COMPLETE CBC AUTOMATED: CPT

## 2019-09-17 PROCEDURE — 83540 ASSAY OF IRON: CPT

## 2019-09-17 PROCEDURE — 36415 COLL VENOUS BLD VENIPUNCTURE: CPT

## 2019-09-17 PROCEDURE — 84466 ASSAY OF TRANSFERRIN: CPT

## 2019-09-17 PROCEDURE — 84443 ASSAY THYROID STIM HORMONE: CPT

## 2019-09-17 PROCEDURE — 80053 COMPREHEN METABOLIC PANEL: CPT

## 2019-09-17 PROCEDURE — 80061 LIPID PANEL: CPT

## 2019-09-20 ENCOUNTER — HOSPITAL ENCOUNTER (OUTPATIENT)
Dept: PHYSICAL THERAPY | Age: 54
Discharge: HOME | End: 2019-09-20

## 2019-09-23 ENCOUNTER — HOSPITAL ENCOUNTER (OUTPATIENT)
Dept: PHYSICAL THERAPY | Age: 54
Setting detail: THERAPIES SERIES
Discharge: HOME | End: 2019-09-23
Payer: COMMERCIAL

## 2019-09-23 PROCEDURE — 97113 AQUATIC THERAPY/EXERCISES: CPT

## 2019-09-26 ENCOUNTER — HOSPITAL ENCOUNTER (OUTPATIENT)
Dept: PHYSICAL THERAPY | Age: 54
Setting detail: THERAPIES SERIES
Discharge: HOME | End: 2019-09-26
Payer: COMMERCIAL

## 2019-09-26 PROCEDURE — 97110 THERAPEUTIC EXERCISES: CPT

## 2019-09-26 PROCEDURE — 97140 MANUAL THERAPY 1/> REGIONS: CPT

## 2019-09-26 NOTE — FLOWSHEET NOTE
Physical Therapy Aquatic Flow Sheet   Date:  2019    Patient Name:  Yoel Hennessy    :  1965  Restrictions/Precautions:    Diagnosis:    L knee OA   Treatment Diagnosis:    Knee DJD/OA   Insurance/Certification information:  [facility]  Referring Physician:    Dr. Darlin Marshall            Any changes in Ambulatory Summary Sheet?:  Plan of care signed (Y/N):      Visit# / total visits: 14    Pain level: 4 /10   Electronically signed by:  Nahomy Serrato PTA    Subjective:   Recovering from being sick last few days. Goals  Long term goals  Time Frame for Long term goals : 6 weeks  Long term goal 1: I in home program  Long term goal 2: up/down one floor of steps with minimal pain. Does this every day with 2-3/10 pain. Does not have the sharp pain like she used to  Long term goal 3: knee arom 0-120     120 flexion. Long term goal 4: mmt 4+ or better L knee flex, extension. 4/5 limited by pain. Long term goal 5: stand/walk 1 hr with manageable pain.    MET  Patient Goals   Patient goals : decrease pain      Key  B= Belt DB= Dumbells T= Theratube   H= Hydrotone N= Noodles W= Weights   P= Paddles S= Speedo equipment K= Kickboard     Exercises/Activities   Warm-up/Amb    Exercises      Slow forward  HR/TR    Slow sideways  Marches    Slow backwards  Mini-squats    Medium forward    4-way SLR    Medium sideways    Hip circles/fig 8    Small shuffle    Hamstring curls    Jog    Knee extension      Braiding    Pelvic tilts      Bicycling    Scap squeezes          Shoulder flex/ext      Functional    Shoulder abd/add      Step    Shoulder H. abd/add      Lifting    Shoulder IR/ER      Hand to opp knee    Rowing      Push down squat    Bilateral pull down      UE PNF    Push/pull      LE PNF    Push downs      Wall push ups    Arm circles      SLS    Elbow flex/ext          Chin tuck      Stretching    UT shrugs/rolls      Gastroc/Soleus  Rocking horse      Hamstring        SKTC    Other Piriformis    5ft    Hip flexor    5ft    Ladder pull   5ft    Pec stretch    Dangle     Post deltoid           Treatment Included:  [x] Therapeutic Exercise   [] Instruction in HEP  [] Group   [] Therapeutic Activity      [] Neuromuscular Re-education [x] Aquatic   [] Gait             [] Manual  Other:    Time In/ Time Out:  0845/  Timed Code Treatment Minutes: Total Treatment Minutes:      Objective Findings:  Crepitus at knee, 120 flexion. SLR 2x5  Adductor stretch knee extended 30 sec x 4, knees flexed 30 sec x 4    Nu step lvl 5 LE only x 5 mins. TPR to quads, adductors. Communication with other providers:    Education to Patient: role of balance with exs for improving knee stability. Adverse Reaction to Treatment:    Assessment:   Patient rated her pain 3/10 in the left knee after treatment.      Treatment/Activity Tolerance:  [x] Patient tolerated treatment well [] Patient limited by fatique  [] Patient limited by pain [] Patient limited by other medical complications  [] Other:     Prognosis: [x] Good [] Fair  [] Poor    Patient Requires Follow-up: [x] Yes  [] No    Plan:   [x] Continue per plan of care [] Alter current plan (see comments)  [] Plan of care initiated [] Hold pending MD visit [] Discharge    See Weekly Progress Note:    [] Yes [] No Next due:        Electronically signed by:  Korey Christensen, PTA [id number]    9/26/2019, 8:47 AM

## 2019-10-01 ENCOUNTER — HOSPITAL ENCOUNTER (OUTPATIENT)
Dept: PHYSICAL THERAPY | Age: 54
Setting detail: THERAPIES SERIES
Discharge: HOME | End: 2019-10-01
Payer: COMMERCIAL

## 2019-10-01 PROCEDURE — 97113 AQUATIC THERAPY/EXERCISES: CPT

## 2019-10-03 ENCOUNTER — HOSPITAL ENCOUNTER (OUTPATIENT)
Dept: PHYSICAL THERAPY | Age: 54
Setting detail: THERAPIES SERIES
Discharge: HOME | End: 2019-10-03
Payer: COMMERCIAL

## 2019-10-03 PROCEDURE — 97110 THERAPEUTIC EXERCISES: CPT

## 2019-10-03 PROCEDURE — 97140 MANUAL THERAPY 1/> REGIONS: CPT

## 2019-10-08 ENCOUNTER — HOSPITAL ENCOUNTER (OUTPATIENT)
Dept: PHYSICAL THERAPY | Age: 54
Setting detail: THERAPIES SERIES
Discharge: HOME | End: 2019-10-08
Payer: COMMERCIAL

## 2019-10-08 PROCEDURE — 97113 AQUATIC THERAPY/EXERCISES: CPT

## 2019-10-10 ENCOUNTER — HOSPITAL ENCOUNTER (OUTPATIENT)
Dept: PHYSICAL THERAPY | Age: 54
Setting detail: THERAPIES SERIES
Discharge: HOME | End: 2019-10-10
Payer: COMMERCIAL

## 2019-10-10 PROCEDURE — 97110 THERAPEUTIC EXERCISES: CPT

## 2019-10-15 ENCOUNTER — HOSPITAL ENCOUNTER (OUTPATIENT)
Dept: PHYSICAL THERAPY | Age: 54
Setting detail: THERAPIES SERIES
Discharge: HOME | End: 2019-10-15
Payer: COMMERCIAL

## 2019-10-15 PROCEDURE — 97113 AQUATIC THERAPY/EXERCISES: CPT

## 2019-10-17 ENCOUNTER — HOSPITAL ENCOUNTER (OUTPATIENT)
Dept: PHYSICAL THERAPY | Age: 54
Setting detail: THERAPIES SERIES
Discharge: HOME | End: 2019-10-17
Payer: COMMERCIAL

## 2019-10-17 PROCEDURE — 97110 THERAPEUTIC EXERCISES: CPT

## 2020-01-03 ENCOUNTER — HOSPITAL ENCOUNTER (OUTPATIENT)
Dept: ULTRASOUND IMAGING | Age: 55
Discharge: HOME | End: 2020-01-03
Payer: COMMERCIAL

## 2020-01-03 ENCOUNTER — HOSPITAL ENCOUNTER (OUTPATIENT)
Age: 55
Discharge: HOME | End: 2020-01-03
Payer: COMMERCIAL

## 2020-01-03 ENCOUNTER — TELEPHONE (OUTPATIENT)
Dept: ORTHOPEDIC SURGERY | Age: 55
End: 2020-01-03

## 2020-01-03 LAB
ALBUMIN SERPL-MCNC: 3.9 GM/DL (ref 3.4–5)
ALP BLD-CCNC: 75 IU/L (ref 40–129)
ALT SERPL-CCNC: 14 U/L (ref 10–40)
ANION GAP SERPL CALCULATED.3IONS-SCNC: 15 MMOL/L (ref 4–16)
AST SERPL-CCNC: 18 IU/L (ref 15–37)
BASOPHILS ABSOLUTE: 0.1 K/CU MM
BASOPHILS RELATIVE PERCENT: 0.6 % (ref 0–1)
BILIRUB SERPL-MCNC: 0.5 MG/DL (ref 0–1)
BUN BLDV-MCNC: 25 MG/DL (ref 6–23)
CALCIUM SERPL-MCNC: 9.2 MG/DL (ref 8.3–10.6)
CHLORIDE BLD-SCNC: 94 MMOL/L (ref 99–110)
CO2: 28 MMOL/L (ref 21–32)
CREAT SERPL-MCNC: 1.4 MG/DL (ref 0.6–1.1)
DIFFERENTIAL TYPE: ABNORMAL
EOSINOPHILS ABSOLUTE: 0.3 K/CU MM
EOSINOPHILS RELATIVE PERCENT: 2.5 % (ref 0–3)
ERYTHROCYTE SEDIMENTATION RATE: 31 MM/HR (ref 0–30)
GFR AFRICAN AMERICAN: 47 ML/MIN/1.73M2
GFR NON-AFRICAN AMERICAN: 39 ML/MIN/1.73M2
GLUCOSE FASTING: 112 MG/DL (ref 70–99)
HCT VFR BLD CALC: 45.1 % (ref 37–47)
HEMOGLOBIN: 15.1 GM/DL (ref 12.5–16)
IMMATURE NEUTROPHIL %: 0.2 % (ref 0–0.43)
LYMPHOCYTES ABSOLUTE: 2.4 K/CU MM
LYMPHOCYTES RELATIVE PERCENT: 19.6 % (ref 24–44)
MCH RBC QN AUTO: 30.4 PG (ref 27–31)
MCHC RBC AUTO-ENTMCNC: 33.5 % (ref 32–36)
MCV RBC AUTO: 90.9 FL (ref 78–100)
MONOCYTES ABSOLUTE: 1 K/CU MM
MONOCYTES RELATIVE PERCENT: 7.7 % (ref 0–4)
PDW BLD-RTO: 14.3 % (ref 11.7–14.9)
PLATELET # BLD: 379 K/CU MM (ref 140–440)
PMV BLD AUTO: 10.6 FL (ref 7.5–11.1)
POTASSIUM SERPL-SCNC: 3.3 MMOL/L (ref 3.5–5.1)
RBC # BLD: 4.96 M/CU MM (ref 4.2–5.4)
SEGMENTED NEUTROPHILS ABSOLUTE COUNT: 8.5 K/CU MM
SEGMENTED NEUTROPHILS RELATIVE PERCENT: 69.4 % (ref 36–66)
SODIUM BLD-SCNC: 137 MMOL/L (ref 135–145)
TOTAL IMMATURE NEUTOROPHIL: 0.03 K/CU MM
TOTAL PROTEIN: 8 GM/DL (ref 6.4–8.2)
WBC # BLD: 12.3 K/CU MM (ref 4–10.5)

## 2020-01-03 PROCEDURE — 85652 RBC SED RATE AUTOMATED: CPT

## 2020-01-03 PROCEDURE — 85025 COMPLETE CBC W/AUTO DIFF WBC: CPT

## 2020-01-03 PROCEDURE — 76705 ECHO EXAM OF ABDOMEN: CPT

## 2020-01-03 PROCEDURE — 36415 COLL VENOUS BLD VENIPUNCTURE: CPT

## 2020-01-03 PROCEDURE — 80053 COMPREHEN METABOLIC PANEL: CPT

## 2020-01-13 NOTE — TELEPHONE ENCOUNTER
I called [employer] and checked on the status of the prior auth. I was informed that the request was denied 1/9/20 and that the denial letter would be refaxed.

## 2020-02-12 ENCOUNTER — OFFICE VISIT (OUTPATIENT)
Dept: ORTHOPEDIC SURGERY | Age: 55
End: 2020-02-12
Payer: COMMERCIAL

## 2020-02-12 VITALS
RESPIRATION RATE: 14 BRPM | WEIGHT: 220.8 LBS | OXYGEN SATURATION: 98 % | BODY MASS INDEX: 41.69 KG/M2 | HEART RATE: 68 BPM | HEIGHT: 61 IN

## 2020-02-12 PROCEDURE — 99212 OFFICE O/P EST SF 10 MIN: CPT | Performed by: ORTHOPAEDIC SURGERY

## 2020-02-12 PROCEDURE — 20610 DRAIN/INJ JOINT/BURSA W/O US: CPT | Performed by: ORTHOPAEDIC SURGERY

## 2020-02-12 PROCEDURE — 4004F PT TOBACCO SCREEN RCVD TLK: CPT | Performed by: ORTHOPAEDIC SURGERY

## 2020-02-12 PROCEDURE — G8427 DOCREV CUR MEDS BY ELIG CLIN: HCPCS | Performed by: ORTHOPAEDIC SURGERY

## 2020-02-12 PROCEDURE — G8484 FLU IMMUNIZE NO ADMIN: HCPCS | Performed by: ORTHOPAEDIC SURGERY

## 2020-02-12 PROCEDURE — 3017F COLORECTAL CA SCREEN DOC REV: CPT | Performed by: ORTHOPAEDIC SURGERY

## 2020-02-12 PROCEDURE — G8417 CALC BMI ABV UP PARAM F/U: HCPCS | Performed by: ORTHOPAEDIC SURGERY

## 2020-02-12 RX ORDER — DICYCLOMINE HCL 20 MG
20 TABLET ORAL EVERY 6 HOURS
COMMUNITY
End: 2020-10-28 | Stop reason: ALTCHOICE

## 2020-02-12 NOTE — PROGRESS NOTES
Patient returns today for her 1st gel injection for her left knee. Patient states pain is a 8/10 today. Patient states that she has a difficult time sleeping at night due to the pain. Patient stats that she feels grinding in her left knee. The pain is achy and will travel into her leg from time to time.

## 2020-02-14 ASSESSMENT — ENCOUNTER SYMPTOMS
COLOR CHANGE: 0
BACK PAIN: 0

## 2020-02-14 NOTE — PROGRESS NOTES
tenderness noted. Left knee: She exhibits swelling, effusion, bony tenderness and abnormal meniscus. She exhibits normal range of motion, no ecchymosis, no deformity, no laceration, no erythema, normal alignment, no LCL laxity, normal patellar mobility and no MCL laxity. Tenderness found. Medial joint line and lateral joint line tenderness noted. No MCL, no LCL and no patellar tendon tenderness noted. Skin:     General: Skin is warm and dry. Coloration: Skin is not pale. Findings: No erythema or rash. Neurological:      Mental Status: She is alert and oriented to person, place, and time. Sensory: No sensory deficit. Left knee-skin intact with no erythema, ecchymosis or lacerations present. Mild joint effusion. Positive Babatunde sign in the medial compartment of the left knee. XRAY  X-ray 3 view of the Left knee reviewed by me today in the office demonstrates age-appropriate bone density throughout, moderate to severe degenerative changes with medial joint space collapse and moderate patellofemoral joint space narrowing, normal tracking of the patella, no acute osseous abnormalities, no bony prominences, no loose bodies. Assessment:      Left knee DJD, moderate to severe      Plan:       I discussed with her today the mechanics of the Supartz injections for her left knee. I did perform her first Supartz injection for her left knee today. I explained to her that we can repeat these injections every 6 months if needed. We can also perform a cortisone injection in 3 months if needed. If the injections do not last we could consider knee replacement surgery. Continue weight-bearing as tolerated. Continue range of motion exercises as instructed. Ice and elevate as needed. Tylenol or Motrin for pain.   Follow up in 1 week for second injection    Knee Injection Procedure Note     Pre-operative Diagnosis: Left knee DJD     Post-operative Diagnosis: same     Indications: Symptom relief from osteoarthritis     Anesthesia: not required     Procedure Details   Verbal consent was obtained for the procedure. The joint was prepped with alcohol. A 22 gauge needle was inserted into the anterior aspect of the joint from a lateral approach. Supartz  syringe injected. The needle was removed and the area cleansed and dressed. Complications: None; patient tolerated the procedure well.                       Murgladys Dates, DO

## 2020-02-19 ENCOUNTER — OFFICE VISIT (OUTPATIENT)
Dept: ORTHOPEDIC SURGERY | Age: 55
End: 2020-02-19

## 2020-02-19 VITALS — BODY MASS INDEX: 41.72 KG/M2 | WEIGHT: 221 LBS | RESPIRATION RATE: 16 BRPM | HEIGHT: 61 IN

## 2020-02-19 PROCEDURE — 99024 POSTOP FOLLOW-UP VISIT: CPT | Performed by: ORTHOPAEDIC SURGERY

## 2020-02-19 ASSESSMENT — ENCOUNTER SYMPTOMS
BACK PAIN: 0
COLOR CHANGE: 0

## 2020-02-19 NOTE — PROGRESS NOTES
Subjective:      Patient ID: Korin Bender is a 53 y.o. female. Patient here for left knee SUPARTZ #2. She rates her pain a 5/10 today. She states that since the first injection she has not noticed much relief of her symptoms. She continues to have a dull, aching pain globally in her left knee. She states that her pain was doing very well after her last set of Orthovisc injections. Patient denies any new injury to the involved extremity/ joint, denies numbness or tingling in the involved extremity and denies fever or chills. Knee Pain    Pertinent negatives include no numbness. Review of Systems   Constitutional: Negative for activity change, chills and fever. Musculoskeletal: Positive for arthralgias, gait problem, joint swelling and myalgias. Negative for back pain. Skin: Negative for color change, pallor, rash and wound. Neurological: Negative for weakness and numbness. Past Medical History:   Diagnosis Date    Hypertension     Thyroid disease          Objective:   Physical Exam  Constitutional:       Appearance: She is well-developed. HENT:      Head: Normocephalic and atraumatic. Eyes:      Pupils: Pupils are equal, round, and reactive to light. Neck:      Musculoskeletal: Normal range of motion. Musculoskeletal: Normal range of motion. General: Tenderness present. No deformity. Right hip: Normal.      Left hip: Normal.      Right knee: She exhibits normal range of motion, no swelling, no effusion, no ecchymosis, no deformity, no laceration, no erythema, normal alignment, no LCL laxity, normal patellar mobility, no bony tenderness and no MCL laxity. No tenderness found. No medial joint line, no lateral joint line, no MCL, no LCL and no patellar tendon tenderness noted. Left knee: She exhibits swelling, effusion, bony tenderness and abnormal meniscus.  She exhibits normal range of motion, no ecchymosis, no deformity, no laceration, no erythema, normal alignment, no LCL laxity, normal patellar mobility and no MCL laxity. Tenderness found. Medial joint line and lateral joint line tenderness noted. No MCL, no LCL and no patellar tendon tenderness noted. Skin:     General: Skin is warm and dry. Coloration: Skin is not pale. Findings: No erythema or rash. Neurological:      Mental Status: She is alert and oriented to person, place, and time. Sensory: No sensory deficit. Left knee-skin intact with no erythema, ecchymosis or lacerations present. Mild joint effusion. Positive Babatunde sign in the medial compartment of the left knee. XRAY  X-ray 3 view of the Left knee reviewed by me today in the office demonstrates age-appropriate bone density throughout, moderate to severe degenerative changes with medial joint space collapse and moderate patellofemoral joint space narrowing, normal tracking of the patella, no acute osseous abnormalities, no bony prominences, no loose bodies. Assessment:      Left knee DJD, moderate to severe      Plan:       I discussed with her today her response to the first Supartz injection for her left knee. I did perform her second Supartz injection for her left knee today. I explained to her that we can repeat these injections every 6 months if needed. We can also perform a cortisone injection in 3 months if needed. If the injections do not last we could consider knee replacement surgery. Continue weight-bearing as tolerated. Continue range of motion exercises as instructed. Ice and elevate as needed. Tylenol or Motrin for pain. Follow up in 1 week for third injection    Knee Injection Procedure Note     Pre-operative Diagnosis: Left knee DJD     Post-operative Diagnosis: same     Indications: Symptom relief from osteoarthritis     Anesthesia: not required     Procedure Details   Verbal consent was obtained for the procedure. The joint was prepped with alcohol.  A 22 gauge needle was

## 2020-02-25 NOTE — PATIENT INSTRUCTIONS
Continue weight-bearing as tolerated. Continue range of motion exercises as instructed. Ice and elevate as needed. Tylenol or Motrin for pain.   Gel injection on the left knee  Follow up in one week

## 2020-02-26 ENCOUNTER — OFFICE VISIT (OUTPATIENT)
Dept: ORTHOPEDIC SURGERY | Age: 55
End: 2020-02-26
Payer: COMMERCIAL

## 2020-02-26 VITALS
RESPIRATION RATE: 15 BRPM | OXYGEN SATURATION: 99 % | BODY MASS INDEX: 42.1 KG/M2 | WEIGHT: 223 LBS | HEART RATE: 99 BPM | HEIGHT: 61 IN

## 2020-02-26 PROCEDURE — 99999 PR OFFICE/OUTPT VISIT,PROCEDURE ONLY: CPT | Performed by: ORTHOPAEDIC SURGERY

## 2020-02-26 PROCEDURE — 20610 DRAIN/INJ JOINT/BURSA W/O US: CPT | Performed by: ORTHOPAEDIC SURGERY

## 2020-02-26 ASSESSMENT — ENCOUNTER SYMPTOMS
COLOR CHANGE: 0
BACK PAIN: 0

## 2020-02-26 NOTE — PROGRESS NOTES
Patient returns today for her 3 gel injection. Patient states that she does notice a change in her pain. Patient sates pain today is a 4/10. Patient states she still has dull aching pain in her left knee. Patient denies any new injuries or accidents.

## 2020-02-26 NOTE — PROGRESS NOTES
Subjective:      Patient ID: Tomeka Manriquez is a 53 y.o. female. Patient returns today for her 3 gel injection. Patient states that she does notice a change in her pain. Patient sates pain today is a 4/10. Patient states she still has dull aching pain in her left knee. Patient denies any new injuries or accidents. She states that since the second injection she has not noticed much relief of her symptoms but she did not notice as much pressure after the second injection. She continues to have a dull, aching pain globally in her left knee. She states that her pain was doing very well after her last set of Orthovisc injections. Patient denies any new injury to the involved extremity/ joint, denies numbness or tingling in the involved extremity and denies fever or chills. Knee Pain    Pertinent negatives include no numbness. Review of Systems   Constitutional: Negative for activity change, chills and fever. Musculoskeletal: Positive for arthralgias, gait problem, joint swelling and myalgias. Negative for back pain. Skin: Negative for color change, pallor, rash and wound. Neurological: Negative for weakness and numbness. Past Medical History:   Diagnosis Date    Hypertension     Thyroid disease          Objective:   Physical Exam  Constitutional:       Appearance: She is well-developed. HENT:      Head: Normocephalic and atraumatic. Eyes:      Pupils: Pupils are equal, round, and reactive to light. Neck:      Musculoskeletal: Normal range of motion. Musculoskeletal: Normal range of motion. General: Tenderness present. No deformity. Right hip: Normal.      Left hip: Normal.      Right knee: She exhibits normal range of motion, no swelling, no effusion, no ecchymosis, no deformity, no laceration, no erythema, normal alignment, no LCL laxity, normal patellar mobility, no bony tenderness and no MCL laxity. No tenderness found.  No medial joint line, no lateral joint line, no MCL, no LCL and no patellar tendon tenderness noted. Left knee: She exhibits swelling, effusion, bony tenderness and abnormal meniscus. She exhibits normal range of motion, no ecchymosis, no deformity, no laceration, no erythema, normal alignment, no LCL laxity, normal patellar mobility and no MCL laxity. Tenderness found. Medial joint line and lateral joint line tenderness noted. No MCL, no LCL and no patellar tendon tenderness noted. Skin:     General: Skin is warm and dry. Coloration: Skin is not pale. Findings: No erythema or rash. Neurological:      Mental Status: She is alert and oriented to person, place, and time. Sensory: No sensory deficit. Left knee-skin intact with no erythema, ecchymosis or lacerations present. Mild joint effusion. Positive Babatunde sign in the medial compartment of the left knee. XRAY  X-ray 3 view of the Left knee reviewed by me today in the office demonstrates age-appropriate bone density throughout, moderate to severe degenerative changes with medial joint space collapse and moderate patellofemoral joint space narrowing, normal tracking of the patella, no acute osseous abnormalities, no bony prominences, no loose bodies. Assessment:      Left knee DJD, moderate to severe      Plan:       I discussed with her today her response to the second Supartz injection for her left knee. I did perform her third Supartz injection for her left knee today. I explained to her that we can repeat these injections every 6 months if needed. We can also perform a cortisone injection in 3 months if needed. If the injections do not last we could consider knee replacement surgery. Continue weight-bearing as tolerated. Continue range of motion exercises as instructed. Ice and elevate as needed. Tylenol or Motrin for pain.   Follow up in 1 week for fourth injection    Knee Injection Procedure Note     Pre-operative Diagnosis: Left knee DJD     Post-operative Diagnosis: same     Indications: Symptom relief from osteoarthritis     Anesthesia: not required     Procedure Details   Verbal consent was obtained for the procedure. The joint was prepped with alcohol. A 22 gauge needle was inserted into the anterior aspect of the joint from a lateral approach. Supartz  syringe injected. The needle was removed and the area cleansed and dressed. Complications: None; patient tolerated the procedure well.                       Brendan 97, DO

## 2020-03-06 ENCOUNTER — OFFICE VISIT (OUTPATIENT)
Dept: ORTHOPEDIC SURGERY | Age: 55
End: 2020-03-06
Payer: COMMERCIAL

## 2020-03-06 VITALS
HEART RATE: 102 BPM | WEIGHT: 225 LBS | HEIGHT: 60 IN | OXYGEN SATURATION: 92 % | BODY MASS INDEX: 44.17 KG/M2 | RESPIRATION RATE: 18 BRPM

## 2020-03-06 PROCEDURE — 99999 PR OFFICE/OUTPT VISIT,PROCEDURE ONLY: CPT | Performed by: PHYSICIAN ASSISTANT

## 2020-03-06 PROCEDURE — 20610 DRAIN/INJ JOINT/BURSA W/O US: CPT | Performed by: PHYSICIAN ASSISTANT

## 2020-03-06 NOTE — PROGRESS NOTES
VISCO-SUPPLEMENTATION INJECTION (Number 4)  I reviewed and agree with the portions of the HPI, review of systems, vital documentation and plan performed by my staff and have added/addended where appropriate. HISTORY OF PRESENT ILLNESS (HPI):   Quan Mendez is a 53y.o. year old female who is here for follow up for visco-supplementation injection number 1 for   1. Primary osteoarthritis of left knee    Patient is a 53year old female that presents in office for Supartz injection # 4 to be administered to the left knee. Pain remains along the medial aspect of the knee with pain rated at 5/10. Feels like she is getting some relief, but still unable to fully confirm effectiveness. No new injury or worsening of symptoms. PAST HISTORY:   Unless otherwise specified in this note, the past history is reviewed today with the patient and is as follows-    No Known Allergies    Current Outpatient Medications   Medication Sig Dispense Refill    dicyclomine (BENTYL) 20 MG tablet Take 20 mg by mouth every 6 hours      furosemide (LASIX) 10 MG/ML solution Take by mouth daily      levothyroxine (SYNTHROID) 125 MCG tablet Take 125 mcg by mouth Daily      lisinopril (PRINIVIL;ZESTRIL) 2.5 MG tablet Take 2.5 mg by mouth daily       No current facility-administered medications for this visit. PHYSICAL EXAM:   Pulse 102   Resp 18   Ht 5' (1.524 m)   Wt 225 lb (102.1 kg)   SpO2 92%   BMI 43.94 kg/m²     The left knee is examined:  Inspection:  Skin is intact. No erythema. Palpation:  No swelling or acute tenderness. Neuro/Vascular/Motor:  Sensation to light touch intact. Capillary refill brisk. No focal motor deficits. DIAGNOSIS:     1. Primary osteoarthritis of left knee        PROCEDURE:   Left Knee Aspiration / Injection Procedure:  Multiple treatment options were discussed. Joint injection was recommended. Details of the procedure, potential risks, and potential benefits were discussed. Patient's questions were answered. Patient elected to proceed with procedure. Medication: Stiven Wasserman  NDC:   [address]-3   Procedure:  Sterile technique was used as the skin over the injection site was prepped with alcohol. The left knee joint was then injected with the above listed medication. A sterile bandage was placed over the injection site. The patient tolerated the procedure well without complication. The patient is scheduled for the 5th injection in one week.

## 2020-03-10 ENCOUNTER — OFFICE VISIT (OUTPATIENT)
Dept: ORTHOPEDIC SURGERY | Age: 55
End: 2020-03-10
Payer: COMMERCIAL

## 2020-03-10 VITALS
HEART RATE: 86 BPM | OXYGEN SATURATION: 96 % | WEIGHT: 221 LBS | RESPIRATION RATE: 16 BRPM | BODY MASS INDEX: 43.16 KG/M2

## 2020-03-10 PROCEDURE — 99999 PR OFFICE/OUTPT VISIT,PROCEDURE ONLY: CPT | Performed by: PHYSICIAN ASSISTANT

## 2020-03-10 PROCEDURE — 20610 DRAIN/INJ JOINT/BURSA W/O US: CPT | Performed by: PHYSICIAN ASSISTANT

## 2020-03-10 NOTE — PROGRESS NOTES
VISCO-SUPPLEMENTATION INJECTION (Number 5)  I reviewed and agree with the portions of the HPI, review of systems, vital documentation and plan performed by my staff and have added/addended where appropriate. HISTORY OF PRESENT ILLNESS (HPI):   Francisca Garg is a 53y.o. year old female who is here for follow up for visco-supplementation injection number 5.   1. Primary osteoarthritis of left knee    Patient is a 53year old female that presents in office for Supartz injection # 5 to be administered to the left knee. Pain remains along the medial aspect of the knee with pain rated at 5/10. PAST HISTORY:   Unless otherwise specified in this note, the past history is reviewed today with the patient and is as follows-    No Known Allergies    Current Outpatient Medications   Medication Sig Dispense Refill    dicyclomine (BENTYL) 20 MG tablet Take 20 mg by mouth every 6 hours      furosemide (LASIX) 10 MG/ML solution Take by mouth daily      levothyroxine (SYNTHROID) 125 MCG tablet Take 125 mcg by mouth Daily      lisinopril (PRINIVIL;ZESTRIL) 2.5 MG tablet Take 2.5 mg by mouth daily       No current facility-administered medications for this visit. PHYSICAL EXAM:   Pulse 86   Resp 16   Wt 221 lb (100.2 kg)   SpO2 96%   BMI 43.16 kg/m²     The left knee is examined:  Inspection:  Skin is intact. No erythema. Mild varicosities crossing the knee joint. Palpation:  No swelling or acute tenderness. Neuro/Vascular/Motor:  Sensation to light touch intact. Capillary refill brisk. No focal motor deficits. DIAGNOSIS:     1. Primary osteoarthritis of left knee        PROCEDURE:   Left Knee Aspiration / Injection Procedure:  Multiple treatment options were discussed. Joint injection was recommended. Details of the procedure, potential risks, and potential benefits were discussed. Patient's questions were answered. Patient elected to proceed with procedure.   Medication: Supartz  NDC: [address]-0   Procedure:  Sterile technique was used as the skin over the injection site was prepped with alcohol. The left knee joint was then injected with the above listed medication. A sterile bandage was placed over the injection site. The patient tolerated the procedure well without complication.   The patient will follow-up as needed

## 2020-08-26 ENCOUNTER — TELEPHONE (OUTPATIENT)
Dept: ORTHOPEDIC SURGERY | Age: 55
End: 2020-08-26

## 2020-08-26 ENCOUNTER — OFFICE VISIT (OUTPATIENT)
Dept: ORTHOPEDIC SURGERY | Age: 55
End: 2020-08-26
Payer: COMMERCIAL

## 2020-08-26 VITALS
BODY MASS INDEX: 41.03 KG/M2 | HEIGHT: 60 IN | WEIGHT: 209 LBS | HEART RATE: 101 BPM | RESPIRATION RATE: 15 BRPM | OXYGEN SATURATION: 99 %

## 2020-08-26 PROCEDURE — 3017F COLORECTAL CA SCREEN DOC REV: CPT | Performed by: ORTHOPAEDIC SURGERY

## 2020-08-26 PROCEDURE — G8417 CALC BMI ABV UP PARAM F/U: HCPCS | Performed by: ORTHOPAEDIC SURGERY

## 2020-08-26 PROCEDURE — 99214 OFFICE O/P EST MOD 30 MIN: CPT | Performed by: ORTHOPAEDIC SURGERY

## 2020-08-26 PROCEDURE — 4004F PT TOBACCO SCREEN RCVD TLK: CPT | Performed by: ORTHOPAEDIC SURGERY

## 2020-08-26 PROCEDURE — G8427 DOCREV CUR MEDS BY ELIG CLIN: HCPCS | Performed by: ORTHOPAEDIC SURGERY

## 2020-08-26 ASSESSMENT — ENCOUNTER SYMPTOMS
BACK PAIN: 0
COLOR CHANGE: 0

## 2020-08-26 NOTE — PROGRESS NOTES
erythema, normal alignment, no LCL laxity, normal patellar mobility and no MCL laxity. Tenderness found. Medial joint line, lateral joint line and patellar tendon tenderness noted. No MCL and no LCL tenderness noted. Left knee: She exhibits swelling, effusion, bony tenderness and abnormal meniscus. She exhibits normal range of motion, no ecchymosis, no deformity, no laceration, no erythema, normal alignment, no LCL laxity, normal patellar mobility and no MCL laxity. Tenderness found. Medial joint line and lateral joint line tenderness noted. No MCL, no LCL and no patellar tendon tenderness noted. Skin:     General: Skin is warm and dry. Coloration: Skin is not pale. Findings: No erythema or rash. Neurological:      Mental Status: She is alert and oriented to person, place, and time. Sensory: No sensory deficit. Right knee-Skin intact with no erythema, ecchymosis or lacerations present. 0-140    Left knee-Skin intact with no erythema, ecchymosis or lacerations present. 0-140      Xr Knee Right (3 Views)    Result Date: 8/26/2020  XRAY X-ray 3 views of the right knee obtained and reviewed by me today in the office demonstrates age appropriate bone density throughout with moderate to severe degenerative changes with moderate medial and patellofemoral joint space narrowing, moderate osteophyte formation in all 3 compartments as well as posterior, normal tracking the patella, no acute osseous abnormalities. Impression: Moderate to severe degenerative changes of the right knee as above with no acute process. XRAY  X-ray 3 view of the Left knee reviewed by me today in the office demonstrates age-appropriate bone density throughout, moderate to severe degenerative changes with medial joint space collapse and moderate patellofemoral joint space narrowing, normal tracking of the patella, no acute osseous abnormalities, no bony prominences, no loose bodies.         Assessment:       Right knee DJD, moderate to severe  Left knee DJD, moderate to severe      Plan:       I discussed with her today her x-ray findings. I explained to her that she does have severe arthritis in the right knee. I also discussed with her today her response to the Orthovisc injections for her left knee. At this point I recommend that we continue with conservative treatment. I discussed with her today performing bilateral Orthovisc injections for her knees. When the injections quit working the next step would be to consider knee replacement surgery. Continue weight-bearing as tolerated. Continue range of motion exercises as instructed. Ice and elevate as needed. Tylenol or Motrin for pain. Follow up after insurance approval to begin bilateral Orthovisc injections.             Brendan 97, DO

## 2020-09-30 ENCOUNTER — OFFICE VISIT (OUTPATIENT)
Dept: ORTHOPEDIC SURGERY | Age: 55
End: 2020-09-30
Payer: COMMERCIAL

## 2020-09-30 VITALS
HEART RATE: 79 BPM | HEIGHT: 60 IN | RESPIRATION RATE: 15 BRPM | OXYGEN SATURATION: 96 % | WEIGHT: 205 LBS | BODY MASS INDEX: 40.25 KG/M2

## 2020-09-30 PROCEDURE — 20610 DRAIN/INJ JOINT/BURSA W/O US: CPT | Performed by: ORTHOPAEDIC SURGERY

## 2020-09-30 ASSESSMENT — ENCOUNTER SYMPTOMS
BACK PAIN: 0
COLOR CHANGE: 0

## 2020-09-30 NOTE — PATIENT INSTRUCTIONS
Continue weight-bearing as tolerated. Continue range of motion exercises as instructed. Ice and elevate as needed. Tylenol or Motrin for pain.   Gel injections given today in bilateral knees  Follow up in one week For #2

## 2020-09-30 NOTE — PROGRESS NOTES
laceration, no erythema, normal alignment, no LCL laxity, normal patellar mobility and no MCL laxity. Tenderness found. Medial joint line, lateral joint line and patellar tendon tenderness noted. No MCL and no LCL tenderness noted. Left knee: She exhibits swelling, effusion, bony tenderness and abnormal meniscus. She exhibits normal range of motion, no ecchymosis, no deformity, no laceration, no erythema, normal alignment, no LCL laxity, normal patellar mobility and no MCL laxity. Tenderness found. Medial joint line and lateral joint line tenderness noted. No MCL, no LCL and no patellar tendon tenderness noted. Skin:     General: Skin is warm and dry. Coloration: Skin is not pale. Findings: No erythema or rash. Neurological:      Mental Status: She is alert and oriented to person, place, and time. Sensory: No sensory deficit. Right knee-Skin intact with no erythema, ecchymosis or lacerations present. 0-140    Left knee-Skin intact with no erythema, ecchymosis or lacerations present. 0-140      Xr Knee Right (3 Views)    Result Date: 8/26/2020  XRAY X-ray 3 views of the right knee reviewed by me today in the office demonstrates age appropriate bone density throughout with moderate to severe degenerative changes with moderate medial and patellofemoral joint space narrowing, moderate osteophyte formation in all 3 compartments as well as posterior, normal tracking the patella, no acute osseous abnormalities. Impression: Moderate to severe degenerative changes of the right knee as above with no acute process. XRAY  X-ray 3 view of the Left knee reviewed by me today in the office demonstrates age-appropriate bone density throughout, moderate to severe degenerative changes with medial joint space collapse and moderate patellofemoral joint space narrowing, normal tracking of the patella, no acute osseous abnormalities, no bony prominences, no loose bodies.         Assessment:       Right knee DJD, moderate to severe  Left knee DJD, moderate to severe      Plan:       I discussed with her today the mechanics of the Supartz injections for her knees. I did perform her first set of bilateral Supartz injections for her knees today. I explained to her that we can repeat these injections every 6 months if needed. When the injections quit working the next step would be to consider knee replacement surgery. Continue weight-bearing as tolerated. Continue range of motion exercises as instructed. Ice and elevate as needed. Tylenol or Motrin for pain. Follow up in 1 week for second set of injections. Knee Injection Procedure Note     Pre-operative Diagnosis: Right knee DJD     Post-operative Diagnosis: same     Indications: Symptom relief from osteoarthritis     Anesthesia: not required     Procedure Details   Verbal consent was obtained for the procedure. The joint was prepped with alcohol. A 22 gauge needle was inserted into the anterior aspect of the joint from a lateral approach. Supartz syringe injected. The needle was removed and the area cleansed and dressed. Complications: None; patient tolerated the procedure well. Knee Injection Procedure Note     Pre-operative Diagnosis: Left knee DJD     Post-operative Diagnosis: same     Indications: Symptom relief from osteoarthritis     Anesthesia: not required     Procedure Details   Verbal consent was obtained for the procedure. The joint was prepped with alcohol. A 22 gauge needle was inserted into the anterior aspect of the joint from a lateral approach. Supartz syringe injected. The needle was removed and the area cleansed and dressed. Complications: None; patient tolerated the procedure well.               Brendan 97, DO

## 2020-09-30 NOTE — PROGRESS NOTES
Pt is here today for her gel injections of the bilateral knees. Pt states pain is a 7/10 constant pain. Pt states pain is worse at night. She has a difficult time getting comfortable to sleep. She has tired a pillow between her legs which does not help with the pain. Pt states her last gel injections did help with her pain.

## 2020-10-07 ENCOUNTER — OFFICE VISIT (OUTPATIENT)
Dept: ORTHOPEDIC SURGERY | Age: 55
End: 2020-10-07
Payer: COMMERCIAL

## 2020-10-07 VITALS
WEIGHT: 205 LBS | RESPIRATION RATE: 15 BRPM | HEIGHT: 60 IN | HEART RATE: 88 BPM | OXYGEN SATURATION: 96 % | BODY MASS INDEX: 40.25 KG/M2

## 2020-10-07 PROCEDURE — 20610 DRAIN/INJ JOINT/BURSA W/O US: CPT | Performed by: ORTHOPAEDIC SURGERY

## 2020-10-07 ASSESSMENT — ENCOUNTER SYMPTOMS
BACK PAIN: 0
COLOR CHANGE: 0

## 2020-10-07 NOTE — PROGRESS NOTES
Subjective:      Patient ID: Ronald Barroso is a 63 y.o. female. Pt is here today for bilateral knee gel injections pain today in bilateral knees is a 6/10 will increase at night. Pt denies any changes in her medical hx since last visit. She comes in today for her second bilateral Supartz injections. She states that she has not noticed much improvement since the first set of injections. She also states that she has been having worsening deep, aching and grinding pain globally in her right knee. Patient denies any new injury to the involved extremity/ joint, denies numbness or tingling in the involved extremity and denies fever or chills. Knee Pain    Pertinent negatives include no numbness. Review of Systems   Constitutional: Negative for activity change, chills and fever. Musculoskeletal: Positive for arthralgias, gait problem, joint swelling and myalgias. Negative for back pain. Skin: Negative for color change, pallor, rash and wound. Neurological: Negative for weakness and numbness. Past Medical History:   Diagnosis Date    Hypertension     Thyroid disease        Objective:   Physical Exam  Constitutional:       Appearance: She is well-developed. HENT:      Head: Normocephalic and atraumatic. Eyes:      Pupils: Pupils are equal, round, and reactive to light. Neck:      Musculoskeletal: Normal range of motion. Musculoskeletal: Normal range of motion. General: Tenderness present. No deformity. Right hip: Normal.      Left hip: Normal.      Right knee: She exhibits bony tenderness. She exhibits normal range of motion, no swelling, no effusion, no ecchymosis, no deformity, no laceration, no erythema, normal alignment, no LCL laxity, normal patellar mobility and no MCL laxity. Tenderness found. Medial joint line, lateral joint line and patellar tendon tenderness noted. No MCL and no LCL tenderness noted.       Left knee: She exhibits swelling, effusion, bony tenderness and abnormal meniscus. She exhibits normal range of motion, no ecchymosis, no deformity, no laceration, no erythema, normal alignment, no LCL laxity, normal patellar mobility and no MCL laxity. Tenderness found. Medial joint line and lateral joint line tenderness noted. No MCL, no LCL and no patellar tendon tenderness noted. Skin:     General: Skin is warm and dry. Coloration: Skin is not pale. Findings: No erythema or rash. Neurological:      Mental Status: She is alert and oriented to person, place, and time. Sensory: No sensory deficit. Right knee-Skin intact with no erythema, ecchymosis or lacerations present. 0-140    Left knee-Skin intact with no erythema, ecchymosis or lacerations present. 0-140      Xr Knee Right (3 Views)    Result Date: 8/26/2020  XRAY X-ray 3 views of the right knee reviewed by me today in the office demonstrates age appropriate bone density throughout with moderate to severe degenerative changes with moderate medial and patellofemoral joint space narrowing, moderate osteophyte formation in all 3 compartments as well as posterior, normal tracking the patella, no acute osseous abnormalities. Impression: Moderate to severe degenerative changes of the right knee as above with no acute process. XRAY  X-ray 3 view of the Left knee reviewed by me today in the office demonstrates age-appropriate bone density throughout, moderate to severe degenerative changes with medial joint space collapse and moderate patellofemoral joint space narrowing, normal tracking of the patella, no acute osseous abnormalities, no bony prominences, no loose bodies. Assessment:       Right knee DJD, moderate to severe  Left knee DJD, moderate to severe      Plan:       I discussed with her today her response to the first set of bilateral Supartz injections. I did perform her second set of bilateral Supartz injections for her knees today.   I explained to her that we can repeat these injections every 6 months if needed. When the injections quit working the next step would be to consider knee replacement surgery. Continue weight-bearing as tolerated. Continue range of motion exercises as instructed. Ice and elevate as needed. Tylenol or Motrin for pain. Follow up in 1 week for third set of injections. Knee Injection Procedure Note     Pre-operative Diagnosis: Right knee DJD     Post-operative Diagnosis: same     Indications: Symptom relief from osteoarthritis     Anesthesia: not required     Procedure Details   Verbal consent was obtained for the procedure. The joint was prepped with alcohol. A 22 gauge needle was inserted into the anterior aspect of the joint from a lateral approach. Supartz syringe injected. The needle was removed and the area cleansed and dressed. Complications: None; patient tolerated the procedure well. Knee Injection Procedure Note     Pre-operative Diagnosis: Left knee DJD     Post-operative Diagnosis: same     Indications: Symptom relief from osteoarthritis     Anesthesia: not required     Procedure Details   Verbal consent was obtained for the procedure. The joint was prepped with alcohol. A 22 gauge needle was inserted into the anterior aspect of the joint from a lateral approach. Supartz syringe injected. The needle was removed and the area cleansed and dressed. Complications: None; patient tolerated the procedure well.               Brendan 97, DO

## 2020-10-07 NOTE — PATIENT INSTRUCTIONS
Continue weight-bearing as tolerated. Continue range of motion exercises as instructed. Ice and elevate as needed. Tylenol or Motrin for pain.   Gel injections given today in the bilateral knees  Follow up next week for #3

## 2020-10-14 ENCOUNTER — OFFICE VISIT (OUTPATIENT)
Dept: ORTHOPEDIC SURGERY | Age: 55
End: 2020-10-14
Payer: COMMERCIAL

## 2020-10-14 VITALS — WEIGHT: 205 LBS | BODY MASS INDEX: 40.25 KG/M2 | HEIGHT: 60 IN | RESPIRATION RATE: 18 BRPM

## 2020-10-14 PROCEDURE — 20610 DRAIN/INJ JOINT/BURSA W/O US: CPT | Performed by: ORTHOPAEDIC SURGERY

## 2020-10-14 NOTE — PATIENT INSTRUCTIONS
MENDEL #3 injections   Rest both knees today   Work on ROM and strengthening of knees and legs   follow up as scheduled for #4 injections

## 2020-10-14 NOTE — PROGRESS NOTES
Patient here for bilateral knee SUPARTZ #3 injections. She states right knee does feel some better today no real changes in the left knee.     Provider needs to conduct a hands on physical today due to patients injury/diagnosis

## 2020-10-16 ASSESSMENT — ENCOUNTER SYMPTOMS
BACK PAIN: 0
COLOR CHANGE: 0

## 2020-10-16 NOTE — PROGRESS NOTES
Subjective:      Patient ID: Vic Park is a 63 y.o. female. Patient here for bilateral knee SUPARTZ #3 injections. She states right knee does feel some better today no real changes in the left knee. Provider needs to conduct a hands on physical today due to patients injury/diagnosis       She comes in today for her third bilateral Supartz injections. She states that she has not noticed much improvement since the second set of injections. She also states that she has been having worsening deep, aching and grinding pain globally in her right knee. Patient denies any new injury to the involved extremity/ joint, denies numbness or tingling in the involved extremity and denies fever or chills. Knee Pain    Pertinent negatives include no numbness. Review of Systems   Constitutional: Negative for activity change, chills and fever. Musculoskeletal: Positive for arthralgias, gait problem, joint swelling and myalgias. Negative for back pain. Skin: Negative for color change, pallor, rash and wound. Neurological: Negative for weakness and numbness. Past Medical History:   Diagnosis Date    Hypertension     Thyroid disease        Objective:   Physical Exam  Constitutional:       Appearance: She is well-developed. HENT:      Head: Normocephalic and atraumatic. Eyes:      Pupils: Pupils are equal, round, and reactive to light. Neck:      Musculoskeletal: Normal range of motion. Musculoskeletal: Normal range of motion. General: Tenderness present. No deformity. Right hip: Normal.      Left hip: Normal.      Right knee: She exhibits bony tenderness. She exhibits normal range of motion, no swelling, no effusion, no ecchymosis, no deformity, no laceration, no erythema, normal alignment, no LCL laxity, normal patellar mobility and no MCL laxity. Tenderness found. Medial joint line, lateral joint line and patellar tendon tenderness noted.  No MCL and no LCL tenderness noted. Left knee: She exhibits swelling, effusion, bony tenderness and abnormal meniscus. She exhibits normal range of motion, no ecchymosis, no deformity, no laceration, no erythema, normal alignment, no LCL laxity, normal patellar mobility and no MCL laxity. Tenderness found. Medial joint line and lateral joint line tenderness noted. No MCL, no LCL and no patellar tendon tenderness noted. Skin:     General: Skin is warm and dry. Coloration: Skin is not pale. Findings: No erythema or rash. Neurological:      Mental Status: She is alert and oriented to person, place, and time. Sensory: No sensory deficit. Right knee-Skin intact with no erythema, ecchymosis or lacerations present. 0-140    Left knee-Skin intact with no erythema, ecchymosis or lacerations present. 0-140      Xr Knee Right (3 Views)    Result Date: 8/26/2020  XRAY X-ray 3 views of the right knee reviewed by me today in the office demonstrates age appropriate bone density throughout with moderate to severe degenerative changes with moderate medial and patellofemoral joint space narrowing, moderate osteophyte formation in all 3 compartments as well as posterior, normal tracking the patella, no acute osseous abnormalities. Impression: Moderate to severe degenerative changes of the right knee as above with no acute process. XRAY  X-ray 3 view of the Left knee reviewed by me today in the office demonstrates age-appropriate bone density throughout, moderate to severe degenerative changes with medial joint space collapse and moderate patellofemoral joint space narrowing, normal tracking of the patella, no acute osseous abnormalities, no bony prominences, no loose bodies. Assessment:       Right knee DJD, moderate to severe  Left knee DJD, moderate to severe      Plan:       I discussed with her today her response to the second set of bilateral Supartz injections.   I did perform her third set of bilateral Supartz injections for her knees today. I explained to her that we can repeat these injections every 6 months if needed. When the injections quit working the next step would be to consider knee replacement surgery. Continue weight-bearing as tolerated. Continue range of motion exercises as instructed. Ice and elevate as needed. Tylenol or Motrin for pain. Follow up in 1 week for fourth set of injections. Knee Injection Procedure Note     Pre-operative Diagnosis: Right knee DJD     Post-operative Diagnosis: same     Indications: Symptom relief from osteoarthritis     Anesthesia: not required     Procedure Details   Verbal consent was obtained for the procedure. The joint was prepped with alcohol. A 22 gauge needle was inserted into the anterior aspect of the joint from a lateral approach. Supartz syringe injected. The needle was removed and the area cleansed and dressed. Complications: None; patient tolerated the procedure well. Knee Injection Procedure Note     Pre-operative Diagnosis: Left knee DJD     Post-operative Diagnosis: same     Indications: Symptom relief from osteoarthritis     Anesthesia: not required     Procedure Details   Verbal consent was obtained for the procedure. The joint was prepped with alcohol. A 22 gauge needle was inserted into the anterior aspect of the joint from a lateral approach. Supartz syringe injected. The needle was removed and the area cleansed and dressed. Complications: None; patient tolerated the procedure well.               Brendan Harvey, DO

## 2020-10-21 ENCOUNTER — OFFICE VISIT (OUTPATIENT)
Dept: ORTHOPEDIC SURGERY | Age: 55
End: 2020-10-21
Payer: COMMERCIAL

## 2020-10-21 VITALS
OXYGEN SATURATION: 94 % | WEIGHT: 205 LBS | HEIGHT: 60 IN | RESPIRATION RATE: 15 BRPM | HEART RATE: 92 BPM | BODY MASS INDEX: 40.25 KG/M2

## 2020-10-21 PROCEDURE — 20610 DRAIN/INJ JOINT/BURSA W/O US: CPT | Performed by: ORTHOPAEDIC SURGERY

## 2020-10-21 ASSESSMENT — ENCOUNTER SYMPTOMS
BACK PAIN: 0
COLOR CHANGE: 0

## 2020-10-21 NOTE — PROGRESS NOTES
Subjective:      Patient ID: Merlin Hilliard is a 63 y.o. female. Pt returns today for her #4 gel injection of the bilateral knees. Pt states pain today in the left knee is a 6/10 right knee 4/10. Increased pain with prolong standing and walking. Pt states she can feel a little relief in the right knee from the gel injections. Pt denies any new injury        She comes in today for her fourth bilateral Supartz injections. She states that she has not noticed much improvement since the third set of injections. She also states that she has been having worsening deep, aching and grinding pain globally in her right knee. Patient denies any new injury to the involved extremity/ joint, denies numbness or tingling in the involved extremity and denies fever or chills. Knee Pain    Pertinent negatives include no numbness. Review of Systems   Constitutional: Negative for activity change, chills and fever. Musculoskeletal: Positive for arthralgias, gait problem, joint swelling and myalgias. Negative for back pain. Skin: Negative for color change, pallor, rash and wound. Neurological: Negative for weakness and numbness. Past Medical History:   Diagnosis Date    Hypertension     Thyroid disease        Objective:   Physical Exam  Constitutional:       Appearance: She is well-developed. HENT:      Head: Normocephalic and atraumatic. Eyes:      Pupils: Pupils are equal, round, and reactive to light. Neck:      Musculoskeletal: Normal range of motion. Musculoskeletal: Normal range of motion. General: Tenderness present. No deformity. Right hip: Normal.      Left hip: Normal.      Right knee: She exhibits bony tenderness. She exhibits normal range of motion, no swelling, no effusion, no ecchymosis, no deformity, no laceration, no erythema, normal alignment, no LCL laxity, normal patellar mobility and no MCL laxity. Tenderness found.  Medial joint line, lateral joint line injections. I did perform her fourth set of bilateral Supartz injections for her knees today. I explained to her that we can repeat these injections every 6 months if needed. When the injections quit working the next step would be to consider knee replacement surgery. Continue weight-bearing as tolerated. Continue range of motion exercises as instructed. Ice and elevate as needed. Tylenol or Motrin for pain. Follow up in 1 week for fifth set of injections. Knee Injection Procedure Note     Pre-operative Diagnosis: Right knee DJD     Post-operative Diagnosis: same     Indications: Symptom relief from osteoarthritis     Anesthesia: not required     Procedure Details   Verbal consent was obtained for the procedure. The joint was prepped with alcohol. A 22 gauge needle was inserted into the anterior aspect of the joint from a lateral approach. Supartz syringe injected. The needle was removed and the area cleansed and dressed. Complications: None; patient tolerated the procedure well. Knee Injection Procedure Note     Pre-operative Diagnosis: Left knee DJD     Post-operative Diagnosis: same     Indications: Symptom relief from osteoarthritis     Anesthesia: not required     Procedure Details   Verbal consent was obtained for the procedure. The joint was prepped with alcohol. A 22 gauge needle was inserted into the anterior aspect of the joint from a lateral approach. Supartz syringe injected. The needle was removed and the area cleansed and dressed. Complications: None; patient tolerated the procedure well.               Brendan Harvey, DO

## 2020-10-21 NOTE — PATIENT INSTRUCTIONS
Continue weight-bearing as tolerated. Continue range of motion exercises as instructed. Ice and elevate as needed. Tylenol or Motrin for pain.   #4 gel injection given today in the bilateral knees  Follow up in one week for #5 gel injections in bilateral knees

## 2020-10-27 NOTE — PATIENT INSTRUCTIONS
Elian # 5  Continue weight-bearing as tolerated. Continue range of motion exercises as instructed. Ice and elevate as needed. Tylenol or Motrin for pain.   Bilateral gel injection given in the knees today  Follow up as needed

## 2020-10-28 ENCOUNTER — OFFICE VISIT (OUTPATIENT)
Dept: ORTHOPEDIC SURGERY | Age: 55
End: 2020-10-28
Payer: COMMERCIAL

## 2020-10-28 VITALS
BODY MASS INDEX: 40.4 KG/M2 | OXYGEN SATURATION: 96 % | RESPIRATION RATE: 18 BRPM | HEART RATE: 71 BPM | WEIGHT: 205.8 LBS | HEIGHT: 60 IN

## 2020-10-28 PROCEDURE — 20610 DRAIN/INJ JOINT/BURSA W/O US: CPT | Performed by: ORTHOPAEDIC SURGERY

## 2020-10-28 ASSESSMENT — ENCOUNTER SYMPTOMS
COLOR CHANGE: 0
BACK PAIN: 0

## 2020-10-28 NOTE — PROGRESS NOTES
Subjective:      Patient ID: Jenn Hamm is a 63 y.o. female. She comes in today for her fifth bilateral Supartz injections. She states that she has noticed some mild improvement since the fourth set of injections. She also states that she has been having worsening deep, aching and grinding pain globally in her right knee. Patient denies any new injury to the involved extremity/ joint, denies numbness or tingling in the involved extremity and denies fever or chills. Knee Pain    Pertinent negatives include no numbness. Review of Systems   Constitutional: Negative for activity change, chills and fever. Musculoskeletal: Positive for arthralgias, gait problem, joint swelling and myalgias. Negative for back pain. Skin: Negative for color change, pallor, rash and wound. Neurological: Negative for weakness and numbness. Past Medical History:   Diagnosis Date    Hypertension     Thyroid disease        Objective:   Physical Exam  Constitutional:       Appearance: She is well-developed. HENT:      Head: Normocephalic and atraumatic. Eyes:      Pupils: Pupils are equal, round, and reactive to light. Neck:      Musculoskeletal: Normal range of motion. Musculoskeletal: Normal range of motion. General: Tenderness present. No deformity. Right hip: Normal.      Left hip: Normal.      Right knee: She exhibits bony tenderness. She exhibits normal range of motion, no swelling, no effusion, no ecchymosis, no deformity, no laceration, no erythema, normal alignment, no LCL laxity, normal patellar mobility and no MCL laxity. Tenderness found. Medial joint line, lateral joint line and patellar tendon tenderness noted. No MCL and no LCL tenderness noted. Left knee: She exhibits swelling, effusion, bony tenderness and abnormal meniscus.  She exhibits normal range of motion, no ecchymosis, no deformity, no laceration, no erythema, normal alignment, no LCL laxity, normal patellar mobility and no MCL laxity. Tenderness found. Medial joint line and lateral joint line tenderness noted. No MCL, no LCL and no patellar tendon tenderness noted. Skin:     General: Skin is warm and dry. Coloration: Skin is not pale. Findings: No erythema or rash. Neurological:      Mental Status: She is alert and oriented to person, place, and time. Sensory: No sensory deficit. Right knee-Skin intact with no erythema, ecchymosis or lacerations present. 0-140    Left knee-Skin intact with no erythema, ecchymosis or lacerations present. 0-140      Xr Knee Right (3 Views)    Result Date: 8/26/2020  XRAY X-ray 3 views of the right knee reviewed by me today in the office demonstrates age appropriate bone density throughout with moderate to severe degenerative changes with moderate medial and patellofemoral joint space narrowing, moderate osteophyte formation in all 3 compartments as well as posterior, normal tracking the patella, no acute osseous abnormalities. Impression: Moderate to severe degenerative changes of the right knee as above with no acute process. XRAY  X-ray 3 view of the Left knee reviewed by me today in the office demonstrates age-appropriate bone density throughout, moderate to severe degenerative changes with medial joint space collapse and moderate patellofemoral joint space narrowing, normal tracking of the patella, no acute osseous abnormalities, no bony prominences, no loose bodies. Assessment:       Right knee DJD, moderate to severe  Left knee DJD, moderate to severe      Plan:       I discussed with her today her response to the fourth set of bilateral Supartz injections. I did perform her fifth set of bilateral Supartz injections for her knees today. I explained to her that we can repeat these injections every 6 months if needed. When the injections quit working the next step would be to consider knee replacement surgery.   Continue weight-bearing as tolerated. Continue range of motion exercises as instructed. Ice and elevate as needed. Tylenol or Motrin for pain. Follow up in 6 weeks for recheck. Knee Injection Procedure Note     Pre-operative Diagnosis: Right knee DJD     Post-operative Diagnosis: same     Indications: Symptom relief from osteoarthritis     Anesthesia: not required     Procedure Details   Verbal consent was obtained for the procedure. The joint was prepped with alcohol. A 22 gauge needle was inserted into the anterior aspect of the joint from a lateral approach. Supartz syringe injected. The needle was removed and the area cleansed and dressed. Complications: None; patient tolerated the procedure well. Knee Injection Procedure Note     Pre-operative Diagnosis: Left knee DJD     Post-operative Diagnosis: same     Indications: Symptom relief from osteoarthritis     Anesthesia: not required     Procedure Details   Verbal consent was obtained for the procedure. The joint was prepped with alcohol. A 22 gauge needle was inserted into the anterior aspect of the joint from a lateral approach. Supartz syringe injected. The needle was removed and the area cleansed and dressed. Complications: None; patient tolerated the procedure well.               Brendan 97, DO

## 2020-12-09 ENCOUNTER — OFFICE VISIT (OUTPATIENT)
Dept: ORTHOPEDIC SURGERY | Age: 55
End: 2020-12-09
Payer: COMMERCIAL

## 2020-12-09 VITALS
HEIGHT: 60 IN | OXYGEN SATURATION: 93 % | BODY MASS INDEX: 39.66 KG/M2 | HEART RATE: 100 BPM | RESPIRATION RATE: 18 BRPM | WEIGHT: 202 LBS

## 2020-12-09 PROCEDURE — G8484 FLU IMMUNIZE NO ADMIN: HCPCS | Performed by: ORTHOPAEDIC SURGERY

## 2020-12-09 PROCEDURE — 3017F COLORECTAL CA SCREEN DOC REV: CPT | Performed by: ORTHOPAEDIC SURGERY

## 2020-12-09 PROCEDURE — 4004F PT TOBACCO SCREEN RCVD TLK: CPT | Performed by: ORTHOPAEDIC SURGERY

## 2020-12-09 PROCEDURE — G8427 DOCREV CUR MEDS BY ELIG CLIN: HCPCS | Performed by: ORTHOPAEDIC SURGERY

## 2020-12-09 PROCEDURE — G8417 CALC BMI ABV UP PARAM F/U: HCPCS | Performed by: ORTHOPAEDIC SURGERY

## 2020-12-09 PROCEDURE — 99212 OFFICE O/P EST SF 10 MIN: CPT | Performed by: ORTHOPAEDIC SURGERY

## 2020-12-09 NOTE — PROGRESS NOTES
Patient returns to the office to discuss bilateral knee pain following completion of visco supplementation series on 10/28/2020. Patient reports improvement within the bilateral knee with the knee no longer giving out or locking up. Pain is rated in office at 3/10. She continues to treat symptoms as needed with Ibuprofen along heat application, and elevation of the legs at night. Overall, patient is satisfied with end result.

## 2020-12-09 NOTE — PATIENT INSTRUCTIONS
Weightbearing and activities as tolerated  Continue to work on ROM and strengthening of the bilateral knee  Take Ibuprofen as needed for pain  Rest, ice, and elevate as needed  Follow up as needed

## 2020-12-11 ASSESSMENT — ENCOUNTER SYMPTOMS
COLOR CHANGE: 0
BACK PAIN: 0

## 2020-12-11 NOTE — PROGRESS NOTES
Subjective:      Patient ID: Alirio Ghosh is a 63 y.o. female. Patient returns to the office to discuss bilateral knee pain following completion of visco supplementation series on 10/28/2020. Patient reports improvement within the bilateral knee with the knee no longer giving out or locking up. Pain is rated in office at 3/10. She continues to treat symptoms as needed with Ibuprofen along heat application, and elevation of the legs at night. Overall, patient is satisfied with end result. She comes in today for her 6-week recheck after bilateral Supartz injections. She states that since the injection she has noticed significant improvement in her symptoms. She states that she does have occasional dull and aching pain in her knees but overall it is much better than it was before the injections. Patient denies any new injury to the involved extremity/ joint, denies numbness or tingling in the involved extremity and denies fever or chills. Knee Pain    Pertinent negatives include no numbness. Review of Systems   Constitutional: Negative for activity change, chills and fever. Musculoskeletal: Positive for arthralgias. Negative for back pain, gait problem, joint swelling and myalgias. Skin: Negative for color change, pallor, rash and wound. Neurological: Negative for weakness and numbness. Past Medical History:   Diagnosis Date    Hypertension     Thyroid disease        Objective:   Physical Exam  Constitutional:       Appearance: She is well-developed. HENT:      Head: Normocephalic and atraumatic. Eyes:      Pupils: Pupils are equal, round, and reactive to light. Neck:      Musculoskeletal: Normal range of motion. Musculoskeletal: Normal range of motion. General: No tenderness or deformity.       Right hip: Normal.      Left hip: Normal.      Right knee: She exhibits normal range of motion, no swelling, no effusion, no ecchymosis, no deformity, no laceration, no erythema, normal alignment, no LCL laxity, normal patellar mobility, no bony tenderness and no MCL laxity. No tenderness found. No medial joint line, no lateral joint line, no MCL and no LCL tenderness noted. Left knee: She exhibits normal range of motion, no swelling, no effusion, no ecchymosis, no deformity, no laceration, no erythema, normal alignment, no LCL laxity, normal patellar mobility, no bony tenderness, normal meniscus and no MCL laxity. No tenderness found. No medial joint line, no lateral joint line, no MCL, no LCL and no patellar tendon tenderness noted. Skin:     General: Skin is warm and dry. Coloration: Skin is not pale. Findings: No erythema or rash. Neurological:      Mental Status: She is alert and oriented to person, place, and time. Sensory: No sensory deficit. Right knee-Skin intact with no erythema, ecchymosis or lacerations present. 0-140    Left knee-Skin intact with no erythema, ecchymosis or lacerations present. 0-140      Xr Knee Right (3 Views)    Result Date: 8/26/2020  XRAY X-ray 3 views of the right knee reviewed by me today in the office demonstrates age appropriate bone density throughout with moderate to severe degenerative changes with moderate medial and patellofemoral joint space narrowing, moderate osteophyte formation in all 3 compartments as well as posterior, normal tracking the patella, no acute osseous abnormalities. Impression: Moderate to severe degenerative changes of the right knee as above with no acute process. XRAY  X-ray 3 view of the Left knee reviewed by me today in the office demonstrates age-appropriate bone density throughout, moderate to severe degenerative changes with medial joint space collapse and moderate patellofemoral joint space narrowing, normal tracking of the patella, no acute osseous abnormalities, no bony prominences, no loose bodies.         Assessment:       Right knee DJD, moderate to severe  Left knee DJD, moderate to severe      Plan:       I discussed with her today her response to the bilateral Supartz injections. At this point we will continue with conservative treatment. I explained to her that we can repeat these injections every 6 months if needed. When the injections quit working the next step would be to consider knee replacement surgery. Continue weight-bearing as tolerated. Continue range of motion exercises as instructed. Ice and elevate as needed. Tylenol or Motrin for pain. Follow up will be as needed.               Brendan 97, DO

## 2021-02-26 ENCOUNTER — HOSPITAL ENCOUNTER (OUTPATIENT)
Age: 56
Discharge: HOME | End: 2021-02-26
Payer: MEDICARE

## 2021-02-26 LAB
ALBUMIN SERPL-MCNC: 4 GM/DL (ref 3.4–5)
ALP BLD-CCNC: 89 IU/L (ref 40–129)
ALT SERPL-CCNC: 9 U/L (ref 10–40)
ANION GAP SERPL CALCULATED.3IONS-SCNC: 13 MMOL/L (ref 4–16)
AST SERPL-CCNC: 12 IU/L (ref 15–37)
BILIRUB SERPL-MCNC: 0.4 MG/DL (ref 0–1)
BUN BLDV-MCNC: 27 MG/DL (ref 6–23)
CALCIUM SERPL-MCNC: 9.9 MG/DL (ref 8.3–10.6)
CHLORIDE BLD-SCNC: 100 MMOL/L (ref 99–110)
CHOLESTEROL, FASTING: 155 MG/DL
CO2: 26 MMOL/L (ref 21–32)
CREAT SERPL-MCNC: 0.8 MG/DL (ref 0.6–1.1)
ESTIMATED AVERAGE GLUCOSE: 123 MG/DL
GFR AFRICAN AMERICAN: >60 ML/MIN/1.73M2
GFR NON-AFRICAN AMERICAN: >60 ML/MIN/1.73M2
GLUCOSE FASTING: 101 MG/DL (ref 70–99)
HBA1C MFR BLD: 5.9 % (ref 4.2–6.3)
HCT VFR BLD CALC: 44 % (ref 37–47)
HDLC SERPL-MCNC: 44 MG/DL
HEMOGLOBIN: 14.7 GM/DL (ref 12.5–16)
LDL CHOLESTEROL DIRECT: 97 MG/DL
MCH RBC QN AUTO: 30.3 PG (ref 27–31)
MCHC RBC AUTO-ENTMCNC: 33.4 % (ref 32–36)
MCV RBC AUTO: 90.7 FL (ref 78–100)
PDW BLD-RTO: 14.9 % (ref 11.7–14.9)
PLATELET # BLD: 418 K/CU MM (ref 140–440)
PMV BLD AUTO: 9.6 FL (ref 7.5–11.1)
POTASSIUM SERPL-SCNC: 4 MMOL/L (ref 3.5–5.1)
RBC # BLD: 4.85 M/CU MM (ref 4.2–5.4)
SODIUM BLD-SCNC: 139 MMOL/L (ref 135–145)
TOTAL PROTEIN: 7.8 GM/DL (ref 6.4–8.2)
TRIGLYCERIDE, FASTING: 133 MG/DL
TSH HIGH SENSITIVITY: 0.09 UIU/ML (ref 0.27–4.2)
WBC # BLD: 12.3 K/CU MM (ref 4–10.5)

## 2021-02-26 PROCEDURE — 80061 LIPID PANEL: CPT

## 2021-02-26 PROCEDURE — 85027 COMPLETE CBC AUTOMATED: CPT

## 2021-02-26 PROCEDURE — 84443 ASSAY THYROID STIM HORMONE: CPT

## 2021-02-26 PROCEDURE — 80053 COMPREHEN METABOLIC PANEL: CPT

## 2021-02-26 PROCEDURE — 36415 COLL VENOUS BLD VENIPUNCTURE: CPT

## 2021-02-26 PROCEDURE — 83036 HEMOGLOBIN GLYCOSYLATED A1C: CPT

## 2021-05-05 ENCOUNTER — TELEPHONE (OUTPATIENT)
Dept: ORTHOPEDIC SURGERY | Age: 56
End: 2021-05-05

## 2021-05-05 ENCOUNTER — OFFICE VISIT (OUTPATIENT)
Dept: ORTHOPEDIC SURGERY | Age: 56
End: 2021-05-05
Payer: MEDICARE

## 2021-05-05 VITALS
RESPIRATION RATE: 16 BRPM | HEART RATE: 100 BPM | HEIGHT: 60 IN | BODY MASS INDEX: 38.13 KG/M2 | OXYGEN SATURATION: 97 % | WEIGHT: 194.2 LBS

## 2021-05-05 DIAGNOSIS — M17.12 PRIMARY OSTEOARTHRITIS OF LEFT KNEE: Primary | ICD-10-CM

## 2021-05-05 PROCEDURE — 99212 OFFICE O/P EST SF 10 MIN: CPT | Performed by: ORTHOPAEDIC SURGERY

## 2021-05-05 PROCEDURE — G8417 CALC BMI ABV UP PARAM F/U: HCPCS | Performed by: ORTHOPAEDIC SURGERY

## 2021-05-05 PROCEDURE — G8427 DOCREV CUR MEDS BY ELIG CLIN: HCPCS | Performed by: ORTHOPAEDIC SURGERY

## 2021-05-05 PROCEDURE — 3017F COLORECTAL CA SCREEN DOC REV: CPT | Performed by: ORTHOPAEDIC SURGERY

## 2021-05-05 PROCEDURE — 4004F PT TOBACCO SCREEN RCVD TLK: CPT | Performed by: ORTHOPAEDIC SURGERY

## 2021-05-05 ASSESSMENT — ENCOUNTER SYMPTOMS
COLOR CHANGE: 0
BACK PAIN: 0

## 2021-05-05 NOTE — PROGRESS NOTES
Subjective:      Patient ID: Audrey Davey is a 61 y.o. female. Patient returns to the office to discuss chronic left knee pain. Patient has had gel injections administered to her left knee with completion of visco supplementation on 3/10/2020 of the Supartz series. Pain is rated at a 7/10 with pain remaining along the  medial aspect of the knee. Associated: swelling, grinding, and the knee giving out with last episode occurring about a month ago when coming down her stairs with NKI. Patient has been conservatively treating her symptoms by the use of Ibuprofen, rest, ice, heat, and soaking in the tub with relief. She wishes to have authorization started to restart gel injections today. No new injury or worsening of symptoms reported. She comes in today for her 6-month recheck after bilateral Supartz injections. She states that since her last set of injections she has been doing very well and has not had much pain. She states that she does continue to have grinding both of the knees. Over the past couple of weeks she has begun having dull, aching pain in both of her knees again, left worse than right. Patient denies any new injury to the involved extremity/ joint, denies numbness or tingling in the involved extremity and denies fever or chills. She would like to proceed with an additional set of Supartz injections for her knees. Knee Pain   Pertinent negatives include no numbness. Review of Systems   Constitutional: Negative for activity change, chills and fever. Musculoskeletal: Positive for arthralgias. Negative for back pain, gait problem, joint swelling and myalgias. Skin: Negative for color change, pallor, rash and wound. Neurological: Negative for weakness and numbness. Past Medical History:   Diagnosis Date    Hypertension     Thyroid disease        Objective:   Physical Exam  Constitutional:       Appearance: She is well-developed.    HENT:      Head: Normocephalic and atraumatic. Eyes:      Pupils: Pupils are equal, round, and reactive to light. Neck:      Musculoskeletal: Normal range of motion. Musculoskeletal: Normal range of motion. General: Tenderness present. No deformity. Right hip: Normal.      Left hip: Normal.      Right knee: She exhibits normal range of motion, no swelling, no effusion, no ecchymosis, no deformity, no laceration, no erythema, normal alignment, no LCL laxity, normal patellar mobility, no bony tenderness and no MCL laxity. Tenderness found. Medial joint line tenderness noted. No lateral joint line, no MCL and no LCL tenderness noted. Left knee: She exhibits normal range of motion, no swelling, no effusion, no ecchymosis, no deformity, no laceration, no erythema, normal alignment, no LCL laxity, normal patellar mobility, no bony tenderness, normal meniscus and no MCL laxity. Tenderness found. Medial joint line tenderness noted. No lateral joint line, no MCL, no LCL and no patellar tendon tenderness noted. Skin:     General: Skin is warm and dry. Coloration: Skin is not pale. Findings: No erythema or rash. Neurological:      Mental Status: She is alert and oriented to person, place, and time. Sensory: No sensory deficit. Right knee-Skin intact with no erythema, ecchymosis or lacerations present. 0-140    Left knee-Skin intact with no erythema, ecchymosis or lacerations present. 0-140      Xr Knee Right (3 Views)    Result Date: 8/26/2020  XRAY X-ray 3 views of the right knee reviewed by me today in the office demonstrates age appropriate bone density throughout with moderate to severe degenerative changes with moderate medial and patellofemoral joint space narrowing, moderate osteophyte formation in all 3 compartments as well as posterior, normal tracking the patella, no acute osseous abnormalities.  Impression: Moderate to severe degenerative changes of the right knee as above with no acute process. Xr Knee Left (3 Views)    Result Date: 5/5/2021  XRAY X-ray 3 views of the left knee obtained and reviewed by me today in the office demonstrates age appropriate bone density throughout with severe degenerative changes with medial joint space collapse, moderate lateral and patellofemoral joint space narrowing with early osteophyte formation in all 3 compartments, normal tracking patella, no acute osseous abnormalities. Impression: Severe degenerative changes of the left knee as above with no acute process. Assessment:       Right knee DJD, moderate to severe  Left knee DJD, moderate to severe      Plan:       I discussed with her today her x-ray findings of her left knee which continues to show severe degenerative changes. I discussed with her today her response to the bilateral Supartz injections. At this point we will continue with conservative treatment. At this point we will get her set up for another set of bilateral Supartz injections. I explained to her that we can repeat these injections every 6 months if needed. When the injections quit working the next step would be to consider knee replacement surgery. Continue weight-bearing as tolerated. Continue range of motion exercises as instructed. Ice and elevate as needed. Tylenol or Motrin for pain. Follow up after insurance approval to proceed with bilateral Supartz injections.               Brendan 97, DO

## 2021-05-05 NOTE — PROGRESS NOTES
Patient returns to the office to discuss chronic left knee pain. Patient has had gel injections administered to her left knee with completion of visco supplementation on 3/10/2020 of the Supartz series. Pain is rated at a 7/10 with pain remaining along the  medial aspect of the knee. Associated: swelling, grinding, and the knee giving out with last episode occurring about a month ago when coming down her stairs with NKI. Patient has been conservatively treating her symptoms by the use of Ibuprofen, rest, ice, heat, and soaking in the tub with relief. She wishes to have authorization started to restart gel injections today. No new injury or worsening of symptoms reported.

## 2021-05-05 NOTE — PATIENT INSTRUCTIONS
Will submit for approval of visco supplementation  Once approval is received and medication is obtained, we will contact you to schedule an appointment to begin series  Continue to use Ibuprofen as needed  Rest, alternate between ice and heat, and elevate as needed  Weightbearing and activities as tolerated  Follow up with office once approval is obtained to begin series

## 2021-06-09 ENCOUNTER — OFFICE VISIT (OUTPATIENT)
Dept: ORTHOPEDIC SURGERY | Age: 56
End: 2021-06-09
Payer: MEDICARE

## 2021-06-09 VITALS
OXYGEN SATURATION: 96 % | HEART RATE: 91 BPM | HEIGHT: 60 IN | RESPIRATION RATE: 16 BRPM | WEIGHT: 191 LBS | BODY MASS INDEX: 37.5 KG/M2

## 2021-06-09 DIAGNOSIS — M17.12 PRIMARY OSTEOARTHRITIS OF LEFT KNEE: Primary | ICD-10-CM

## 2021-06-09 DIAGNOSIS — M17.11 PRIMARY OSTEOARTHRITIS OF RIGHT KNEE: ICD-10-CM

## 2021-06-09 PROCEDURE — 20610 DRAIN/INJ JOINT/BURSA W/O US: CPT | Performed by: ORTHOPAEDIC SURGERY

## 2021-06-09 ASSESSMENT — ENCOUNTER SYMPTOMS
COLOR CHANGE: 0
BACK PAIN: 0

## 2021-06-09 NOTE — PROGRESS NOTES
Subjective:      Patient ID: Carissa Slaughter is a 61 y.o. female. Patient returns to the office for the start of Supartz series for the bilateral knee. Pain is rated between a 3-4 today with continued grinding, swelling, and the knee giving out on a daily basis. She has not had any new injury or worsening of symptoms. She continues to treat her symptoms conservatively by the use of Ibuprofen as needed. She comes in today to begin her next set of bilateral Supartz injections. She states that since her last set of injections she has been doing very well and has not had much pain. She states that she does continue to have grinding both of the knees. Over the past couple of weeks she has begun having dull, aching pain in both of her knees again, left worse than right. Patient denies any new injury to the involved extremity/ joint, denies numbness or tingling in the involved extremity and denies fever or chills. Knee Pain   Pertinent negatives include no numbness. Review of Systems   Constitutional: Negative for activity change, chills and fever. Musculoskeletal: Positive for arthralgias. Negative for back pain, gait problem, joint swelling and myalgias. Skin: Negative for color change, pallor, rash and wound. Neurological: Negative for weakness and numbness. Past Medical History:   Diagnosis Date    Hypertension     Thyroid disease        Objective:   Physical Exam  Constitutional:       Appearance: She is well-developed. HENT:      Head: Normocephalic and atraumatic. Eyes:      Pupils: Pupils are equal, round, and reactive to light. Musculoskeletal:         General: Tenderness present. No deformity. Normal range of motion. Cervical back: Normal range of motion. Right hip: Normal.      Left hip: Normal.      Right knee: No swelling, deformity, effusion, erythema, ecchymosis, lacerations or bony tenderness. Normal range of motion.  Tenderness present Right knee DJD, moderate to severe  Left knee DJD, moderate to severe      Plan:       I discussed with her again today the mechanics of the Supartz injections for her knees. I did perform her first set of bilateral Supartz injections today. I explained to her that we can repeat these injections every 6 months if needed. When the injections quit working the next step would be to consider knee replacement surgery. Continue weight-bearing as tolerated. Continue range of motion exercises as instructed. Ice and elevate as needed. Tylenol or Motrin for pain. Follow up 1 week for second set of injections. Knee Injection Procedure Note     Pre-operative Diagnosis: Right knee DJD     Post-operative Diagnosis: same     Indications: Symptom relief from osteoarthritis     Anesthesia: not required     Procedure Details   Verbal consent was obtained for the procedure. The joint was prepped with alcohol. A 22 gauge needle was inserted into the anterior aspect of the joint from a lateral approach. Supartz syringe injected. The needle was removed and the area cleansed and dressed. Complications: None; patient tolerated the procedure well. Knee Injection Procedure Note     Pre-operative Diagnosis: Left knee DJD     Post-operative Diagnosis: same     Indications: Symptom relief from osteoarthritis     Anesthesia: not required     Procedure Details   Verbal consent was obtained for the procedure. The joint was prepped with alcohol. A 22 gauge needle was inserted into the anterior aspect of the joint from a lateral approach. Supartz syringe injected. The needle was removed and the area cleansed and dressed. Complications: None; patient tolerated the procedure well.                 Brendan 97, DO

## 2021-06-09 NOTE — PATIENT INSTRUCTIONS
Supartz # 1  Rest the bilateral knee for 24-48 hours  Work on ROM and strengthening exercises as tolerated  Rest, ice, and elevate as needed   Weightbearing and activities as tolerated  Follow up next week for Supartz # 2

## 2021-06-16 ENCOUNTER — OFFICE VISIT (OUTPATIENT)
Dept: ORTHOPEDIC SURGERY | Age: 56
End: 2021-06-16
Payer: MEDICARE

## 2021-06-16 VITALS
BODY MASS INDEX: 37.89 KG/M2 | HEART RATE: 73 BPM | OXYGEN SATURATION: 96 % | RESPIRATION RATE: 16 BRPM | WEIGHT: 193 LBS | HEIGHT: 60 IN

## 2021-06-16 DIAGNOSIS — M17.12 PRIMARY OSTEOARTHRITIS OF LEFT KNEE: Primary | ICD-10-CM

## 2021-06-16 DIAGNOSIS — M17.11 PRIMARY OSTEOARTHRITIS OF RIGHT KNEE: ICD-10-CM

## 2021-06-16 PROCEDURE — 20610 DRAIN/INJ JOINT/BURSA W/O US: CPT | Performed by: ORTHOPAEDIC SURGERY

## 2021-06-16 RX ORDER — IBUPROFEN 800 MG/1
800 TABLET ORAL EVERY 6 HOURS PRN
COMMUNITY

## 2021-06-16 RX ORDER — PHENOL 1.4 %
AEROSOL, SPRAY (ML) MUCOUS MEMBRANE
COMMUNITY

## 2021-06-16 ASSESSMENT — ENCOUNTER SYMPTOMS
COLOR CHANGE: 0
BACK PAIN: 0

## 2021-06-16 NOTE — PROGRESS NOTES
Subjective:      Patient ID: Carmen Stephenson is a 61 y.o. female. Patient returns to the office for second injection of the Supartz series to be administered to the bilateral knee. Pain is rated in office at a 5/10 with continued grinding, swelling, and the knee occasionally giving out. She is now taking Ibuprofen at night which is being prescribed to her by her PCP, Dr. Daisy Condon to manage her knee pain which is allowing her to sleep better along with Melatonin. No new injury or worsening symptoms reported today. She comes in today for her second set of bilateral Supartz injections. She states that she has not noticed much improvement since the first set of injections. She states that she does continue to have grinding both of the knees. Over the past couple of weeks she has begun having dull, aching pain in both of her knees again, left worse than right. Patient denies any new injury to the involved extremity/ joint, denies numbness or tingling in the involved extremity and denies fever or chills. Knee Pain   Pertinent negatives include no numbness. Review of Systems   Constitutional: Negative for activity change, chills and fever. Musculoskeletal: Positive for arthralgias. Negative for back pain, gait problem, joint swelling and myalgias. Skin: Negative for color change, pallor, rash and wound. Neurological: Negative for weakness and numbness. Past Medical History:   Diagnosis Date    Hypertension     Thyroid disease        Objective:   Physical Exam  Constitutional:       Appearance: She is well-developed. HENT:      Head: Normocephalic and atraumatic. Eyes:      Pupils: Pupils are equal, round, and reactive to light. Musculoskeletal:         General: Tenderness present. No deformity. Normal range of motion. Cervical back: Normal range of motion.       Right hip: Normal.      Left hip: Normal.      Right knee: No swelling, deformity, effusion, erythema, ecchymosis, lacerations or bony tenderness. Normal range of motion. Tenderness present over the medial joint line. No lateral joint line, MCL or LCL tenderness. No LCL laxity or MCL laxity. Normal alignment and normal patellar mobility. Left knee: No swelling, deformity, effusion, erythema, ecchymosis, lacerations or bony tenderness. Normal range of motion. Tenderness present over the medial joint line. No lateral joint line, MCL, LCL or patellar tendon tenderness. No LCL laxity or MCL laxity. Normal alignment, normal meniscus and normal patellar mobility. Skin:     General: Skin is warm and dry. Coloration: Skin is not pale. Findings: No erythema or rash. Neurological:      Mental Status: She is alert and oriented to person, place, and time. Sensory: No sensory deficit. Right knee-Skin intact with no erythema, ecchymosis or lacerations present. 0-140    Left knee-Skin intact with no erythema, ecchymosis or lacerations present. 0-140      Xr Knee Right (3 Views)    Result Date: 8/26/2020  XRAY X-ray 3 views of the right knee reviewed by me today in the office demonstrates age appropriate bone density throughout with moderate to severe degenerative changes with moderate medial and patellofemoral joint space narrowing, moderate osteophyte formation in all 3 compartments as well as posterior, normal tracking the patella, no acute osseous abnormalities. Impression: Moderate to severe degenerative changes of the right knee as above with no acute process. Xr Knee Left (3 Views)    Result Date: 5/5/2021  XRAY X-ray 3 views of the left knee reviewed by me today in the office demonstrates age appropriate bone density throughout with severe degenerative changes with medial joint space collapse, moderate lateral and patellofemoral joint space narrowing with early osteophyte formation in all 3 compartments, normal tracking patella, no acute osseous abnormalities.  Impression: Severe

## 2021-06-16 NOTE — PROGRESS NOTES
Patient returns to the office for second injection of the Supartz series to be administered to the bilateral knee. Pain is rated in office at a 5/10 with continued grinding, swelling, and the knee occasionally giving out. She is now taking Ibuprofen at night which is being prescribed to her by her PCP, Dr. Josiah Nowak to manage her knee pain which is allowing her to sleep better along with Melatonin. No new injury or worsening symptoms reported today.

## 2021-06-16 NOTE — PATIENT INSTRUCTIONS
Elian # 2  Rest the bilateral knee for 24-48 hours  Work on ROM and strengthening exercises as tolerated  May continue to take Ibuprofen as needed  Rest, ice, and elevate as needed  Weightbearing and activities as tolerated  Follow up for Elian # 3 Print and I can do this

## 2021-06-30 ENCOUNTER — OFFICE VISIT (OUTPATIENT)
Dept: ORTHOPEDIC SURGERY | Age: 56
End: 2021-06-30
Payer: MEDICARE

## 2021-06-30 VITALS
BODY MASS INDEX: 37.89 KG/M2 | HEART RATE: 86 BPM | RESPIRATION RATE: 16 BRPM | WEIGHT: 193 LBS | HEIGHT: 60 IN | OXYGEN SATURATION: 96 %

## 2021-06-30 DIAGNOSIS — M17.11 PRIMARY OSTEOARTHRITIS OF RIGHT KNEE: Primary | ICD-10-CM

## 2021-06-30 DIAGNOSIS — M17.12 PRIMARY OSTEOARTHRITIS OF LEFT KNEE: ICD-10-CM

## 2021-06-30 PROCEDURE — 20610 DRAIN/INJ JOINT/BURSA W/O US: CPT | Performed by: ORTHOPAEDIC SURGERY

## 2021-06-30 ASSESSMENT — ENCOUNTER SYMPTOMS
COLOR CHANGE: 0
BACK PAIN: 0

## 2021-06-30 NOTE — PATIENT INSTRUCTIONS
Supartz # 3  Rest the bilateral knee for 24-48 hours  Rest, ice, and elevate as needed  Weightbearing and activities as tolerated  May take Ibuprofen as needed  Follow up next week for Supartz # 4

## 2021-06-30 NOTE — PROGRESS NOTES
Subjective:      Patient ID: Jason Mortensen is a 61 y.o. female. Patient returns to the office for her third Supartz injection to be administered to the bilateral knee. Pain is rated in office at a 510 with patient beginning to feel relief within the left knee and having more grinding sensation within the right knee. No worsening of symptoms reported. No new injury reported. She comes in today for her third set of bilateral Supartz injections. She states that she has not noticed much improvement since the second set of injections. She states that she does continue to have grinding both of the knees. Over the past couple of weeks she has begun having dull, aching pain in both of her knees again, left worse than right. Patient denies any new injury to the involved extremity/ joint, denies numbness or tingling in the involved extremity and denies fever or chills. Knee Pain   Pertinent negatives include no numbness. Review of Systems   Constitutional: Negative for activity change, chills and fever. Musculoskeletal: Positive for arthralgias. Negative for back pain, gait problem, joint swelling and myalgias. Skin: Negative for color change, pallor, rash and wound. Neurological: Negative for weakness and numbness. Past Medical History:   Diagnosis Date    Hypertension     Thyroid disease        Objective:   Physical Exam  Constitutional:       Appearance: She is well-developed. HENT:      Head: Normocephalic and atraumatic. Eyes:      Pupils: Pupils are equal, round, and reactive to light. Musculoskeletal:         General: Tenderness present. No deformity. Normal range of motion. Cervical back: Normal range of motion. Right hip: Normal.      Left hip: Normal.      Right knee: No swelling, deformity, effusion, erythema, ecchymosis, lacerations or bony tenderness. Normal range of motion. Tenderness present over the medial joint line.  No lateral joint line, MCL or LCL tenderness. No LCL laxity or MCL laxity. Normal alignment and normal patellar mobility. Left knee: No swelling, deformity, effusion, erythema, ecchymosis, lacerations or bony tenderness. Normal range of motion. Tenderness present over the medial joint line. No lateral joint line, MCL, LCL or patellar tendon tenderness. No LCL laxity or MCL laxity. Normal alignment, normal meniscus and normal patellar mobility. Skin:     General: Skin is warm and dry. Coloration: Skin is not pale. Findings: No erythema or rash. Neurological:      Mental Status: She is alert and oriented to person, place, and time. Sensory: No sensory deficit. Right knee-Skin intact with no erythema, ecchymosis or lacerations present. 0-140    Left knee-Skin intact with no erythema, ecchymosis or lacerations present. 0-140      Xr Knee Right (3 Views)    Result Date: 8/26/2020  XRAY X-ray 3 views of the right knee reviewed by me today in the office demonstrates age appropriate bone density throughout with moderate to severe degenerative changes with moderate medial and patellofemoral joint space narrowing, moderate osteophyte formation in all 3 compartments as well as posterior, normal tracking the patella, no acute osseous abnormalities. Impression: Moderate to severe degenerative changes of the right knee as above with no acute process. Xr Knee Left (3 Views)    Result Date: 5/5/2021  XRAY X-ray 3 views of the left knee reviewed by me today in the office demonstrates age appropriate bone density throughout with severe degenerative changes with medial joint space collapse, moderate lateral and patellofemoral joint space narrowing with early osteophyte formation in all 3 compartments, normal tracking patella, no acute osseous abnormalities. Impression: Severe degenerative changes of the left knee as above with no acute process.            Assessment:       Right knee DJD, moderate to severe  Left knee DJD, moderate to severe      Plan:       I discussed with her today her response to the second set of bilateral Supartz injections. I did perform her third set of bilateral Supartz injections today. I explained to her that we can repeat these injections every 6 months if needed. When the injections quit working the next step would be to consider knee replacement surgery. Continue weight-bearing as tolerated. Continue range of motion exercises as instructed. Ice and elevate as needed. Tylenol or Motrin for pain. Follow up 1 week for 4 set of injections. Knee Injection Procedure Note     Pre-operative Diagnosis: Right knee DJD     Post-operative Diagnosis: same     Indications: Symptom relief from osteoarthritis     Anesthesia: not required     Procedure Details   Verbal consent was obtained for the procedure. The joint was prepped with alcohol. A 22 gauge needle was inserted into the anterior aspect of the joint from a lateral approach. Supartz syringe injected. The needle was removed and the area cleansed and dressed. Complications: None; patient tolerated the procedure well. Knee Injection Procedure Note     Pre-operative Diagnosis: Left knee DJD     Post-operative Diagnosis: same     Indications: Symptom relief from osteoarthritis     Anesthesia: not required     Procedure Details   Verbal consent was obtained for the procedure. The joint was prepped with alcohol. A 22 gauge needle was inserted into the anterior aspect of the joint from a lateral approach. Supartz syringe injected. The needle was removed and the area cleansed and dressed. Complications: None; patient tolerated the procedure well.                 Brendan Harvey, DO

## 2021-07-07 ENCOUNTER — OFFICE VISIT (OUTPATIENT)
Dept: ORTHOPEDIC SURGERY | Age: 56
End: 2021-07-07
Payer: MEDICARE

## 2021-07-07 VITALS
RESPIRATION RATE: 16 BRPM | HEIGHT: 63 IN | WEIGHT: 193 LBS | OXYGEN SATURATION: 93 % | HEART RATE: 94 BPM | BODY MASS INDEX: 34.2 KG/M2

## 2021-07-07 DIAGNOSIS — M17.12 PRIMARY OSTEOARTHRITIS OF LEFT KNEE: ICD-10-CM

## 2021-07-07 DIAGNOSIS — M17.11 PRIMARY OSTEOARTHRITIS OF RIGHT KNEE: Primary | ICD-10-CM

## 2021-07-07 PROCEDURE — 20610 DRAIN/INJ JOINT/BURSA W/O US: CPT | Performed by: ORTHOPAEDIC SURGERY

## 2021-07-07 ASSESSMENT — ENCOUNTER SYMPTOMS
BACK PAIN: 0
COLOR CHANGE: 0

## 2021-07-07 NOTE — PROGRESS NOTES
Patient returns to the office for her fourth injection of Supartz series with pain rated in office today at a 4/10. She has begun to notice improvement within the left knee and no longer having grinding sensation upon ambulation. Pain remains more within the right knee than the left at this time as previously reported. No new or worsening symptoms to report in office today.

## 2021-07-07 NOTE — PROGRESS NOTES
Tenderness present over the medial joint line. No lateral joint line, MCL or LCL tenderness. No LCL laxity or MCL laxity. Normal alignment and normal patellar mobility. Left knee: No swelling, deformity, effusion, erythema, ecchymosis, lacerations or bony tenderness. Normal range of motion. Tenderness present over the medial joint line. No lateral joint line, MCL, LCL or patellar tendon tenderness. No LCL laxity or MCL laxity. Normal alignment, normal meniscus and normal patellar mobility. Skin:     General: Skin is warm and dry. Coloration: Skin is not pale. Findings: No erythema or rash. Neurological:      Mental Status: She is alert and oriented to person, place, and time. Sensory: No sensory deficit. Right knee-Skin intact with no erythema, ecchymosis or lacerations present. 0-140    Left knee-Skin intact with no erythema, ecchymosis or lacerations present. 0-140      Xr Knee Right (3 Views)    Result Date: 8/26/2020  XRAY X-ray 3 views of the right knee reviewed by me today in the office demonstrates age appropriate bone density throughout with moderate to severe degenerative changes with moderate medial and patellofemoral joint space narrowing, moderate osteophyte formation in all 3 compartments as well as posterior, normal tracking the patella, no acute osseous abnormalities. Impression: Moderate to severe degenerative changes of the right knee as above with no acute process. Xr Knee Left (3 Views)    Result Date: 5/5/2021  XRAY X-ray 3 views of the left knee reviewed by me today in the office demonstrates age appropriate bone density throughout with severe degenerative changes with medial joint space collapse, moderate lateral and patellofemoral joint space narrowing with early osteophyte formation in all 3 compartments, normal tracking patella, no acute osseous abnormalities. Impression: Severe degenerative changes of the left knee as above with no acute process. Assessment:       Right knee DJD, moderate to severe  Left knee DJD, moderate to severe      Plan:       I discussed with her today her response to the third set of bilateral Supartz injections. I did perform her fourth set of bilateral Supartz injections today. I explained to her that we can repeat these injections every 6 months if needed. When the injections quit working the next step would be to consider knee replacement surgery. Continue weight-bearing as tolerated. Continue range of motion exercises as instructed. Ice and elevate as needed. Tylenol or Motrin for pain. Follow up 1 week for fifth set of injections. Knee Injection Procedure Note     Pre-operative Diagnosis: Right knee DJD     Post-operative Diagnosis: same     Indications: Symptom relief from osteoarthritis     Anesthesia: not required     Procedure Details   Verbal consent was obtained for the procedure. The joint was prepped with alcohol. A 22 gauge needle was inserted into the anterior aspect of the joint from a lateral approach. Supartz syringe injected. The needle was removed and the area cleansed and dressed. Complications: None; patient tolerated the procedure well. Knee Injection Procedure Note     Pre-operative Diagnosis: Left knee DJD     Post-operative Diagnosis: same     Indications: Symptom relief from osteoarthritis     Anesthesia: not required     Procedure Details   Verbal consent was obtained for the procedure. The joint was prepped with alcohol. A 22 gauge needle was inserted into the anterior aspect of the joint from a lateral approach. Supartz syringe injected. The needle was removed and the area cleansed and dressed. Complications: None; patient tolerated the procedure well.                 Brendan 97, DO

## 2021-07-07 NOTE — PATIENT INSTRUCTIONS
Elian # 4  Rest the bilateral knee for 24-48 hours  Rest, ice, and elevate as needed  Work on ROM and strengthening exercises as tolerated  May take Ibuprofen or Motrin as needed  Weightbearing and activities as tolerated  Follow up next week for Supartz # 5

## 2021-07-14 ENCOUNTER — OFFICE VISIT (OUTPATIENT)
Dept: ORTHOPEDIC SURGERY | Age: 56
End: 2021-07-14
Payer: MEDICARE

## 2021-07-14 VITALS
RESPIRATION RATE: 16 BRPM | HEIGHT: 63 IN | HEART RATE: 71 BPM | OXYGEN SATURATION: 97 % | WEIGHT: 193 LBS | BODY MASS INDEX: 34.2 KG/M2

## 2021-07-14 DIAGNOSIS — M17.11 PRIMARY OSTEOARTHRITIS OF RIGHT KNEE: Primary | ICD-10-CM

## 2021-07-14 DIAGNOSIS — M17.12 PRIMARY OSTEOARTHRITIS OF LEFT KNEE: ICD-10-CM

## 2021-07-14 PROCEDURE — 20610 DRAIN/INJ JOINT/BURSA W/O US: CPT | Performed by: ORTHOPAEDIC SURGERY

## 2021-07-14 NOTE — PATIENT INSTRUCTIONS
Continue weight-bearing as tolerated. Continue range of motion exercises as instructed. Ice and elevate as needed. Tylenol or Motrin for pain.   Gel injection #5 today   Follow up in 6 weeks

## 2021-07-15 ASSESSMENT — ENCOUNTER SYMPTOMS
COLOR CHANGE: 0
BACK PAIN: 0

## 2021-07-15 NOTE — PROGRESS NOTES
Subjective:      Patient ID: Garo Neil is a 61 y.o. female. Patient returns to the office today for her 5th gel injection of the bilateral knees. Pt states she is starting to feel decrease in pain in the knees. Pt states overall she is getting better       She comes in today for her fifth set of bilateral Supartz injections. She states that she has continued to notice improvement in her pain since the fourth set of injections. She states that she does continue to have grinding both of the knees. Over the past couple of weeks she has begun having dull, aching pain in both of her knees again, left worse than right. Patient denies any new injury to the involved extremity/ joint, denies numbness or tingling in the involved extremity and denies fever or chills. Knee Pain   Pertinent negatives include no numbness. Review of Systems   Constitutional: Negative for activity change, chills and fever. Musculoskeletal: Positive for arthralgias. Negative for back pain, gait problem, joint swelling and myalgias. Skin: Negative for color change, pallor, rash and wound. Neurological: Negative for weakness and numbness. Past Medical History:   Diagnosis Date    Hypertension     Thyroid disease        Objective:   Physical Exam  Constitutional:       Appearance: She is well-developed. HENT:      Head: Normocephalic and atraumatic. Eyes:      Pupils: Pupils are equal, round, and reactive to light. Musculoskeletal:         General: Tenderness present. No deformity. Normal range of motion. Cervical back: Normal range of motion. Right hip: Normal.      Left hip: Normal.      Right knee: No swelling, deformity, effusion, erythema, ecchymosis, lacerations or bony tenderness. Normal range of motion. Tenderness present over the medial joint line. No lateral joint line, MCL or LCL tenderness. No LCL laxity or MCL laxity. Normal alignment and normal patellar mobility. Left knee: No swelling, deformity, effusion, erythema, ecchymosis, lacerations or bony tenderness. Normal range of motion. Tenderness present over the medial joint line. No lateral joint line, MCL, LCL or patellar tendon tenderness. No LCL laxity or MCL laxity. Normal alignment, normal meniscus and normal patellar mobility. Skin:     General: Skin is warm and dry. Coloration: Skin is not pale. Findings: No erythema or rash. Neurological:      Mental Status: She is alert and oriented to person, place, and time. Sensory: No sensory deficit. Right knee-Skin intact with no erythema, ecchymosis or lacerations present. 0-140    Left knee-Skin intact with no erythema, ecchymosis or lacerations present. 0-140      Xr Knee Right (3 Views)    Result Date: 8/26/2020  XRAY X-ray 3 views of the right knee reviewed by me today in the office demonstrates age appropriate bone density throughout with moderate to severe degenerative changes with moderate medial and patellofemoral joint space narrowing, moderate osteophyte formation in all 3 compartments as well as posterior, normal tracking the patella, no acute osseous abnormalities. Impression: Moderate to severe degenerative changes of the right knee as above with no acute process. Xr Knee Left (3 Views)    Result Date: 5/5/2021  XRAY X-ray 3 views of the left knee reviewed by me today in the office demonstrates age appropriate bone density throughout with severe degenerative changes with medial joint space collapse, moderate lateral and patellofemoral joint space narrowing with early osteophyte formation in all 3 compartments, normal tracking patella, no acute osseous abnormalities. Impression: Severe degenerative changes of the left knee as above with no acute process.            Assessment:       Right knee DJD, moderate to severe  Left knee DJD, moderate to severe      Plan:       I discussed with her today her response to the fourth set of bilateral Supartz injections. I did perform her fifth set of bilateral Supartz injections today. I explained to her that we can repeat these injections every 6 months if needed. When the injections quit working the next step would be to consider knee replacement surgery. Continue weight-bearing as tolerated. Continue range of motion exercises as instructed. Ice and elevate as needed. Tylenol or Motrin for pain. Follow up 6 months for recheck and additional Supartz injections if needed. Knee Injection Procedure Note     Pre-operative Diagnosis: Right knee DJD     Post-operative Diagnosis: same     Indications: Symptom relief from osteoarthritis     Anesthesia: not required     Procedure Details   Verbal consent was obtained for the procedure. The joint was prepped with alcohol. A 22 gauge needle was inserted into the anterior aspect of the joint from a lateral approach. Supartz syringe injected. The needle was removed and the area cleansed and dressed. Complications: None; patient tolerated the procedure well. Knee Injection Procedure Note     Pre-operative Diagnosis: Left knee DJD     Post-operative Diagnosis: same     Indications: Symptom relief from osteoarthritis     Anesthesia: not required     Procedure Details   Verbal consent was obtained for the procedure. The joint was prepped with alcohol. A 22 gauge needle was inserted into the anterior aspect of the joint from a lateral approach. Supartz syringe injected. The needle was removed and the area cleansed and dressed. Complications: None; patient tolerated the procedure well.                 Brendan 97, DO

## 2022-02-09 ENCOUNTER — OFFICE VISIT (OUTPATIENT)
Dept: ORTHOPEDIC SURGERY | Age: 57
End: 2022-02-09
Payer: MEDICARE

## 2022-02-09 ENCOUNTER — TELEPHONE (OUTPATIENT)
Dept: ORTHOPEDIC SURGERY | Age: 57
End: 2022-02-09

## 2022-02-09 VITALS
OXYGEN SATURATION: 98 % | HEART RATE: 73 BPM | RESPIRATION RATE: 15 BRPM | HEIGHT: 63 IN | WEIGHT: 193 LBS | BODY MASS INDEX: 34.2 KG/M2

## 2022-02-09 DIAGNOSIS — M17.11 PRIMARY OSTEOARTHRITIS OF RIGHT KNEE: Primary | ICD-10-CM

## 2022-02-09 DIAGNOSIS — M17.12 PRIMARY OSTEOARTHRITIS OF LEFT KNEE: ICD-10-CM

## 2022-02-09 PROCEDURE — G8417 CALC BMI ABV UP PARAM F/U: HCPCS | Performed by: ORTHOPAEDIC SURGERY

## 2022-02-09 PROCEDURE — 99212 OFFICE O/P EST SF 10 MIN: CPT | Performed by: ORTHOPAEDIC SURGERY

## 2022-02-09 PROCEDURE — G8427 DOCREV CUR MEDS BY ELIG CLIN: HCPCS | Performed by: ORTHOPAEDIC SURGERY

## 2022-02-09 PROCEDURE — G8484 FLU IMMUNIZE NO ADMIN: HCPCS | Performed by: ORTHOPAEDIC SURGERY

## 2022-02-09 PROCEDURE — 3017F COLORECTAL CA SCREEN DOC REV: CPT | Performed by: ORTHOPAEDIC SURGERY

## 2022-02-09 PROCEDURE — 4004F PT TOBACCO SCREEN RCVD TLK: CPT | Performed by: ORTHOPAEDIC SURGERY

## 2022-02-09 RX ORDER — LISINOPRIL AND HYDROCHLOROTHIAZIDE 25; 20 MG/1; MG/1
TABLET ORAL
COMMUNITY
Start: 2022-02-08

## 2022-02-09 ASSESSMENT — ENCOUNTER SYMPTOMS
BACK PAIN: 0
COLOR CHANGE: 0

## 2022-02-09 NOTE — PROGRESS NOTES
Subjective:      Patient ID: Johnson Dougherty is a 61 y.o. female. Patient present to the office today of Fu of the bilateral knee gel injections. Last injection given on 7/14/21. Pt states the gel injections always help with her pain. Pt states she started to noticed increase pain in the knees a few weeks ago. Pt states   Left knee pain 7/10, Right knee 5/10. Pt states she would like to start the gel injections again. She comes in today for her 6-month recheck after bilateral Supartz injections. She states that since her last set of injections she has been doing very well and has not had much pain. She states that she does continue to have grinding both of the knees. Over the past couple of weeks she has begun having dull, aching pain in both of her knees again, left worse than right. Patient denies any new injury to the involved extremity/ joint, denies numbness or tingling in the involved extremity and denies fever or chills. She would like to proceed with an additional set of Supartz injections for her knees. Knee Pain   Pertinent negatives include no numbness. Review of Systems   Constitutional: Negative for activity change, chills and fever. Musculoskeletal: Positive for arthralgias. Negative for back pain, gait problem, joint swelling and myalgias. Skin: Negative for color change, pallor, rash and wound. Neurological: Negative for weakness and numbness. Past Medical History:   Diagnosis Date    Hypertension     Thyroid disease        Objective:   Physical Exam  Constitutional:       Appearance: She is well-developed. HENT:      Head: Normocephalic and atraumatic. Eyes:      Pupils: Pupils are equal, round, and reactive to light. Musculoskeletal:         General: Tenderness present. No deformity. Normal range of motion. Cervical back: Normal range of motion.       Right hip: Normal.      Left hip: Normal.      Right knee: No swelling, deformity, effusion, erythema, ecchymosis, lacerations or bony tenderness. Normal range of motion. Tenderness present over the medial joint line. No lateral joint line, MCL or LCL tenderness. No LCL laxity or MCL laxity. Normal alignment and normal patellar mobility. Left knee: No swelling, deformity, effusion, erythema, ecchymosis, lacerations or bony tenderness. Normal range of motion. Tenderness present over the medial joint line. No lateral joint line, MCL, LCL or patellar tendon tenderness. No LCL laxity or MCL laxity. Normal alignment, normal meniscus and normal patellar mobility. Skin:     General: Skin is warm and dry. Coloration: Skin is not pale. Findings: No erythema or rash. Neurological:      Mental Status: She is alert and oriented to person, place, and time. Sensory: No sensory deficit. Right knee-Skin intact with no erythema, ecchymosis or lacerations present. 0-140    Left knee-Skin intact with no erythema, ecchymosis or lacerations present. 0-140      Xr Knee Right (3 Views)    Result Date: 8/26/2020  XRAY X-ray 3 views of the right knee reviewed by me today in the office demonstrates age appropriate bone density throughout with moderate to severe degenerative changes with moderate medial and patellofemoral joint space narrowing, moderate osteophyte formation in all 3 compartments as well as posterior, normal tracking the patella, no acute osseous abnormalities. Impression: Moderate to severe degenerative changes of the right knee as above with no acute process.      Xr Knee Left (3 Views)    Result Date: 5/5/2021  XRAY X-ray 3 views of the left knee obtained and reviewed by me today in the office demonstrates age appropriate bone density throughout with severe degenerative changes with medial joint space collapse, moderate lateral and patellofemoral joint space narrowing with early osteophyte formation in all 3 compartments, normal tracking patella, no acute osseous abnormalities. Impression: Severe degenerative changes of the left knee as above with no acute process. Assessment:       Right knee DJD, moderate to severe  Left knee DJD, moderate to severe      Plan:       I discussed with her today her x-ray findings of her left knee which continues to show severe degenerative changes. I discussed with her today her response to the bilateral Supartz injections. At this point we will continue with conservative treatment. At this point we will get her set up for another set of bilateral Supartz injections. I explained to her that we can repeat these injections every 6 months if needed. When the injections quit working the next step would be to consider knee replacement surgery. Continue weight-bearing as tolerated. Continue range of motion exercises as instructed. Ice and elevate as needed. Tylenol or Motrin for pain. Follow up after insurance approval to proceed with bilateral Supartz injections.               Brendan Harvey, DO

## 2022-02-09 NOTE — PATIENT INSTRUCTIONS
Continue weight-bearing as tolerated. Continue range of motion exercises as instructed. Ice and elevate as needed. Tylenol or Motrin for pain. Get gel injections approved for the bilateral knees   Once approved we will call to make an appt.

## 2022-02-09 NOTE — PROGRESS NOTES
Patient present to the office today of Fu of the bilateral knee gel injections. Last injection given on 7/14/21. Pt states the gel injections always help with her pain. Pt states she started to noticed increase pain in the knees a few weeks ago. Pt states   Left knee pain 7/10, Right knee 5/10. Pt states she would like to start the gel injections again.

## 2022-02-23 ENCOUNTER — OFFICE VISIT (OUTPATIENT)
Dept: ORTHOPEDIC SURGERY | Age: 57
End: 2022-02-23
Payer: MEDICARE

## 2022-02-23 VITALS
HEIGHT: 62 IN | BODY MASS INDEX: 35.51 KG/M2 | OXYGEN SATURATION: 97 % | RESPIRATION RATE: 16 BRPM | WEIGHT: 193 LBS | HEART RATE: 58 BPM

## 2022-02-23 DIAGNOSIS — M17.11 PRIMARY OSTEOARTHRITIS OF RIGHT KNEE: Primary | ICD-10-CM

## 2022-02-23 DIAGNOSIS — M17.12 PRIMARY OSTEOARTHRITIS OF LEFT KNEE: ICD-10-CM

## 2022-02-23 PROCEDURE — 20610 DRAIN/INJ JOINT/BURSA W/O US: CPT | Performed by: ORTHOPAEDIC SURGERY

## 2022-02-23 ASSESSMENT — ENCOUNTER SYMPTOMS
BACK PAIN: 0
COLOR CHANGE: 0

## 2022-02-23 NOTE — PATIENT INSTRUCTIONS
Continue weight-bearing as tolerated. Continue range of motion exercises as instructed. Ice and elevate as needed. Tylenol or Motrin for pain. Orthovisc injection #1 given today in both knees. Follow up in 1 week for the #2nd injection.

## 2022-02-23 NOTE — PROGRESS NOTES
Subjective:      Patient ID: Franko Todd is a 61 y.o. female. Patient returns to the office b/l knee gel injections. Pt stated that she has a not had any increased or decreased pain since the last visit. Pt stated that her right knee causes more problems than her left. Pt stated she is at a constant aching pain on the medial aspect of her knees. Pt stated that her pain will increase at night and she will need to take 800 ibuprofen just to fall asleep. Pt has tried putting a pillow between her legs with minimal relief. Pt denies any new injuries or falls onto the knees. She comes in today for her first set of bilateral Orthovisc injections. She states that since her last set of injections she has been doing very well and has not had much pain. She states that she does continue to have grinding both of the knees. Over the past couple of weeks she has begun having dull, aching pain in both of her knees again, left worse than right. Patient denies any new injury to the involved extremity/ joint, denies numbness or tingling in the involved extremity and denies fever or chills. Knee Pain   Pertinent negatives include no numbness. Review of Systems   Constitutional: Negative for activity change, chills and fever. Musculoskeletal: Positive for arthralgias. Negative for back pain, gait problem, joint swelling and myalgias. Skin: Negative for color change, pallor, rash and wound. Neurological: Negative for weakness and numbness. Past Medical History:   Diagnosis Date    Hypertension     Thyroid disease        Objective:   Physical Exam  Constitutional:       Appearance: She is well-developed. HENT:      Head: Normocephalic and atraumatic. Eyes:      Pupils: Pupils are equal, round, and reactive to light. Musculoskeletal:         General: Tenderness present. No deformity. Normal range of motion. Cervical back: Normal range of motion.       Right hip: Normal. Left hip: Normal.      Right knee: No swelling, deformity, effusion, erythema, ecchymosis, lacerations or bony tenderness. Normal range of motion. Tenderness present over the medial joint line. No lateral joint line, MCL or LCL tenderness. No LCL laxity or MCL laxity. Normal alignment and normal patellar mobility. Left knee: No swelling, deformity, effusion, erythema, ecchymosis, lacerations or bony tenderness. Normal range of motion. Tenderness present over the medial joint line. No lateral joint line, MCL, LCL or patellar tendon tenderness. No LCL laxity or MCL laxity. Normal alignment, normal meniscus and normal patellar mobility. Skin:     General: Skin is warm and dry. Coloration: Skin is not pale. Findings: No erythema or rash. Neurological:      Mental Status: She is alert and oriented to person, place, and time. Sensory: No sensory deficit. Right knee-Skin intact with no erythema, ecchymosis or lacerations present. 0-140    Left knee-Skin intact with no erythema, ecchymosis or lacerations present. 0-140      Xr Knee Right (3 Views)    Result Date: 8/26/2020  XRAY X-ray 3 views of the right knee reviewed by me today in the office demonstrates age appropriate bone density throughout with moderate to severe degenerative changes with moderate medial and patellofemoral joint space narrowing, moderate osteophyte formation in all 3 compartments as well as posterior, normal tracking the patella, no acute osseous abnormalities. Impression: Moderate to severe degenerative changes of the right knee as above with no acute process.      Xr Knee Left (3 Views)    Result Date: 5/5/2021  XRAY X-ray 3 views of the left knee obtained and reviewed by me today in the office demonstrates age appropriate bone density throughout with severe degenerative changes with medial joint space collapse, moderate lateral and patellofemoral joint space narrowing with early osteophyte formation in all 3 compartments, normal tracking patella, no acute osseous abnormalities. Impression: Severe degenerative changes of the left knee as above with no acute process. Assessment:       Right knee DJD, moderate to severe  Left knee DJD, moderate to severe      Plan:       I discussed with her today the mechanics of the Orthovisc injections for her knees. I did perform her first set of bilateral Orthovisc injections today. When the injections quit working the next step would be to consider knee replacement surgery. Continue weight-bearing as tolerated. Continue range of motion exercises as instructed. Ice and elevate as needed. Tylenol or Motrin for pain. Follow up in 1 week for second set of injections. Knee Injection Procedure Note     Pre-operative Diagnosis: Right knee DJD     Post-operative Diagnosis: same     Indications: Symptom relief from osteoarthritis     Anesthesia: not required     Procedure Details   Verbal consent was obtained for the procedure. The joint was prepped with alcohol. A 22 gauge needle was inserted into the anterior aspect of the joint from a lateral approach. Orthovisc syringe injected. The needle was removed and the area cleansed and dressed. Complications: None; patient tolerated the procedure well. Knee Injection Procedure Note     Pre-operative Diagnosis: Left knee DJD     Post-operative Diagnosis: same     Indications: Symptom relief from osteoarthritis     Anesthesia: not required     Procedure Details   Verbal consent was obtained for the procedure. The joint was prepped with alcohol. A 22 gauge needle was inserted into the anterior aspect of the joint from a lateral approach. Orthovisc syringe injected. The needle was removed and the area cleansed and dressed. Complications: None; patient tolerated the procedure well.                 Brendan 97, DO

## 2022-02-23 NOTE — PROGRESS NOTES
Patient returns to the office b/l knee gel injections. Pt stated that she has a not had any increased or decreased pain since the last visit. Pt stated that her right knee causes more problems than her left. Pt stated she is at a constant aching pain on the medial aspect of her knees. Pt stated that her pain will increase at night and she will need to take 800 ibuprofen just to fall asleep. Pt has tried putting a pillow between her legs with minimal relief. Pt denies any new injuries or falls onto the knees.

## 2022-03-02 ENCOUNTER — OFFICE VISIT (OUTPATIENT)
Dept: ORTHOPEDIC SURGERY | Age: 57
End: 2022-03-02
Payer: MEDICARE

## 2022-03-02 DIAGNOSIS — M17.12 PRIMARY OSTEOARTHRITIS OF LEFT KNEE: Primary | ICD-10-CM

## 2022-03-02 DIAGNOSIS — M17.11 PRIMARY OSTEOARTHRITIS OF RIGHT KNEE: ICD-10-CM

## 2022-03-02 PROCEDURE — 20610 DRAIN/INJ JOINT/BURSA W/O US: CPT | Performed by: ORTHOPAEDIC SURGERY

## 2022-03-02 ASSESSMENT — ENCOUNTER SYMPTOMS
COLOR CHANGE: 0
BACK PAIN: 0

## 2022-03-02 NOTE — PROGRESS NOTES
Subjective:      Patient ID: Hanh Guidry is a 61 y.o. female. Patient presents to the office today for fu of the bilateral knee gel injections. Pt states she did have some increase pain after her last injection. Pt states pain today is a 5/10 in both knees. Pt denies any new injury      She comes in today for her second set of bilateral Orthovisc injections. She states that she has noticed some mild improvement since the first set of injections. She states that she does continue to have grinding both of the knees. Over the past couple of weeks she has begun having dull, aching pain in both of her knees again, left worse than right. Patient denies any new injury to the involved extremity/ joint, denies numbness or tingling in the involved extremity and denies fever or chills. Knee Pain   Pertinent negatives include no numbness. Review of Systems   Constitutional: Negative for activity change, chills and fever. Musculoskeletal: Positive for arthralgias. Negative for back pain, gait problem, joint swelling and myalgias. Skin: Negative for color change, pallor, rash and wound. Neurological: Negative for weakness and numbness. Past Medical History:   Diagnosis Date    Hypertension     Thyroid disease        Objective:   Physical Exam  Constitutional:       Appearance: She is well-developed. HENT:      Head: Normocephalic and atraumatic. Eyes:      Pupils: Pupils are equal, round, and reactive to light. Musculoskeletal:         General: Tenderness present. No deformity. Normal range of motion. Cervical back: Normal range of motion. Right hip: Normal.      Left hip: Normal.      Right knee: No swelling, deformity, effusion, erythema, ecchymosis, lacerations or bony tenderness. Normal range of motion. Tenderness present over the medial joint line. No lateral joint line, MCL or LCL tenderness. No LCL laxity or MCL laxity.  Normal alignment and normal patellar mobility. Left knee: No swelling, deformity, effusion, erythema, ecchymosis, lacerations or bony tenderness. Normal range of motion. Tenderness present over the medial joint line. No lateral joint line, MCL, LCL or patellar tendon tenderness. No LCL laxity or MCL laxity. Normal alignment, normal meniscus and normal patellar mobility. Skin:     General: Skin is warm and dry. Coloration: Skin is not pale. Findings: No erythema or rash. Neurological:      Mental Status: She is alert and oriented to person, place, and time. Sensory: No sensory deficit. Right knee-Skin intact with no erythema, ecchymosis or lacerations present. 0-140    Left knee-Skin intact with no erythema, ecchymosis or lacerations present. 0-140      Xr Knee Right (3 Views)    Result Date: 8/26/2020  XRAY X-ray 3 views of the right knee reviewed by me today in the office demonstrates age appropriate bone density throughout with moderate to severe degenerative changes with moderate medial and patellofemoral joint space narrowing, moderate osteophyte formation in all 3 compartments as well as posterior, normal tracking the patella, no acute osseous abnormalities. Impression: Moderate to severe degenerative changes of the right knee as above with no acute process. Xr Knee Left (3 Views)    Result Date: 5/5/2021  XRAY X-ray 3 views of the left knee obtained and reviewed by me today in the office demonstrates age appropriate bone density throughout with severe degenerative changes with medial joint space collapse, moderate lateral and patellofemoral joint space narrowing with early osteophyte formation in all 3 compartments, normal tracking patella, no acute osseous abnormalities. Impression: Severe degenerative changes of the left knee as above with no acute process.            Assessment:       Right knee DJD, moderate to severe  Left knee DJD, moderate to severe      Plan:         I did perform her second set of bilateral Orthovisc injections today. When the injections quit working the next step would be to consider knee replacement surgery. Continue weight-bearing as tolerated. Continue range of motion exercises as instructed. Ice and elevate as needed. Tylenol or Motrin for pain. Follow up in 1 week for third set of injections. Her right knee injection was slightly lower at the insertion site. Her left knee injection was just below the vessel and her lateral skin and slightly higher than the right knee injection site. Knee Injection Procedure Note     Pre-operative Diagnosis: Right knee DJD     Post-operative Diagnosis: same     Indications: Symptom relief from osteoarthritis     Anesthesia: not required     Procedure Details   Verbal consent was obtained for the procedure. The joint was prepped with alcohol. A 22 gauge needle was inserted into the anterior aspect of the joint from a lateral approach. Orthovisc syringe injected. The needle was removed and the area cleansed and dressed. Complications: None; patient tolerated the procedure well. Knee Injection Procedure Note     Pre-operative Diagnosis: Left knee DJD     Post-operative Diagnosis: same     Indications: Symptom relief from osteoarthritis     Anesthesia: not required     Procedure Details   Verbal consent was obtained for the procedure. The joint was prepped with alcohol. A 22 gauge needle was inserted into the anterior aspect of the joint from a lateral approach. Orthovisc syringe injected. The needle was removed and the area cleansed and dressed. Complications: None; patient tolerated the procedure well.                 Brendan Harvey, DO

## 2022-03-02 NOTE — PROGRESS NOTES
Patient presents to the office today for fu of the bilateral knee gel injections. Pt states she did have some increase pain after her last injection. Pt states pain today is a 5/10 in both knees.  Pt denies any new injury

## 2022-03-02 NOTE — PATIENT INSTRUCTIONS
Continue weight-bearing as tolerated. Continue range of motion exercises as instructed. Ice and elevate as needed. Tylenol or Motrin for pain.   Gel #2 given today in the bilateral knees  Follow up in one week

## 2022-03-08 ENCOUNTER — HOSPITAL ENCOUNTER (OUTPATIENT)
Age: 57
Discharge: HOME | End: 2022-03-08
Payer: MEDICARE

## 2022-03-08 LAB
ALBUMIN SERPL-MCNC: 3.9 GM/DL (ref 3.4–5)
ALP BLD-CCNC: 87 IU/L (ref 40–129)
ALT SERPL-CCNC: 8 U/L (ref 10–40)
ANION GAP SERPL CALCULATED.3IONS-SCNC: 13 MMOL/L (ref 4–16)
AST SERPL-CCNC: 12 IU/L (ref 15–37)
BILIRUB SERPL-MCNC: 0.3 MG/DL (ref 0–1)
BUN BLDV-MCNC: 23 MG/DL (ref 6–23)
CALCIUM SERPL-MCNC: 9.5 MG/DL (ref 8.3–10.6)
CHLORIDE BLD-SCNC: 97 MMOL/L (ref 99–110)
CHOLESTEROL, FASTING: 178 MG/DL
CO2: 24 MMOL/L (ref 21–32)
CREAT SERPL-MCNC: 0.8 MG/DL (ref 0.6–1.1)
GFR AFRICAN AMERICAN: >60 ML/MIN/1.73M2
GFR NON-AFRICAN AMERICAN: >60 ML/MIN/1.73M2
GLUCOSE FASTING: 100 MG/DL (ref 70–99)
HDLC SERPL-MCNC: 49 MG/DL
LDL CHOLESTEROL CALCULATED: 111 MG/DL
POTASSIUM SERPL-SCNC: 4 MMOL/L (ref 3.5–5.1)
SODIUM BLD-SCNC: 134 MMOL/L (ref 135–145)
TOTAL PROTEIN: 7.1 GM/DL (ref 6.4–8.2)
TRIGLYCERIDE, FASTING: 91 MG/DL
TSH HIGH SENSITIVITY: 1 UIU/ML (ref 0.27–4.2)

## 2022-03-08 PROCEDURE — 84443 ASSAY THYROID STIM HORMONE: CPT

## 2022-03-08 PROCEDURE — 80061 LIPID PANEL: CPT

## 2022-03-08 PROCEDURE — 36415 COLL VENOUS BLD VENIPUNCTURE: CPT

## 2022-03-08 PROCEDURE — 80053 COMPREHEN METABOLIC PANEL: CPT

## 2022-03-16 ENCOUNTER — HOSPITAL ENCOUNTER (EMERGENCY)
Age: 57
Discharge: HOME | End: 2022-03-16
Attending: EMERGENCY MEDICINE
Payer: MEDICARE

## 2022-03-16 VITALS
WEIGHT: 193 LBS | TEMPERATURE: 99.5 F | BODY MASS INDEX: 37.89 KG/M2 | HEIGHT: 60 IN | RESPIRATION RATE: 16 BRPM | DIASTOLIC BLOOD PRESSURE: 83 MMHG | SYSTOLIC BLOOD PRESSURE: 121 MMHG | HEART RATE: 83 BPM | OXYGEN SATURATION: 95 %

## 2022-03-16 DIAGNOSIS — K04.7 DENTAL ABSCESS: Primary | ICD-10-CM

## 2022-03-16 PROCEDURE — 41800 DRAINAGE OF GUM LESION: CPT

## 2022-03-16 PROCEDURE — 99284 EMERGENCY DEPT VISIT MOD MDM: CPT

## 2022-03-16 RX ORDER — CLINDAMYCIN HYDROCHLORIDE 150 MG/1
450 CAPSULE ORAL 3 TIMES DAILY
Qty: 90 CAPSULE | Refills: 0 | Status: SHIPPED | OUTPATIENT
Start: 2022-03-16 | End: 2022-03-26

## 2022-03-16 ASSESSMENT — PAIN DESCRIPTION - PAIN TYPE: TYPE: ACUTE PAIN

## 2022-03-16 ASSESSMENT — PAIN SCALES - GENERAL: PAINLEVEL_OUTOF10: 7

## 2022-03-16 ASSESSMENT — PAIN DESCRIPTION - LOCATION: LOCATION: MOUTH

## 2022-03-16 NOTE — ED NOTES
Discharge instructions reviewed with patient. Medications discussed. Encouraged to take medication exactly as prescribed and until the entire antibiotic prescription is finished. Patient advised to not stop the medication even if they begin feeling better. Patient voiced understanding. Discharge instructions reviewed with patient. Reviewed medications with patient. No additional questions asked. Voiced understanding. Encouraged patient to follow up as discussed by the ED physician.      Georgiana Gardner RN  03/16/22 7545

## 2022-03-16 NOTE — ED PROVIDER NOTES
EMERGENCY DEPARTMENT ENCOUNTER    Patient: Rodolfo North  MRN: [id number]  : 1965  Date of Evaluation: 3/16/2022  ED Provider:  Kaushal Quinones MD    CHIEF COMPLAINT  Chief Complaint   Patient presents with    Abscess     states awoke this morning and has an abscess on the roof of her mouth from 2 broken teeth. states she has no dentist and her dr isn't in today and she just needs antibiotics       HPI  Rodolfo North is a 61 y.o. female who presents with an area of pain, redness and swelling localized to the left hard palate. The duration has been constant since the onset 2 days ago. Reports that she has extensive dental decay and does not currently have a dentist but is attempting to find one. The pain is moderate in severity and constant aggravated with palpation. There are no alleviating factors. Denies any other associated symptoms or complaints or concerns. REVIEW OF SYSTEMS    Constitutional: negative for fever, chills  Neurological: negative for HA, focal weakness, loss of sensation  Ophthalmic: negative for vision change  ENT: negative for congestion, rhinorrhea  Cardiovascular: negative for chest pain  Respiratory: negative for SOB, cough  GI: negative for abdominal pain, nausea, vomiting, diarrhea, constipation  : negative for dysuria, hematuria  Musculoskeletal: negative for myalgias, decreased ROM, joint swelling  Dermatological: negative for itching, open wounds  Heme: Negative for bleeding, bruising      PAST MEDICAL HISTORY  Past Medical History:   Diagnosis Date    Hypertension     Thyroid disease        CURRENT MEDICATIONS  [unfilled]    ALLERGIES  No Known Allergies    SURGICAL HISTORY  Past Surgical History:   Procedure Laterality Date    ABDOMEN SURGERY      HYSTERECTOMY         FAMILY HISTORY  History reviewed. No pertinent family history.     SOCIAL HISTORY  Social History     Socioeconomic History    Marital status:      Spouse name: None  Number of children: None    Years of education: None    Highest education level: None   Occupational History    None   Tobacco Use    Smoking status: Current Every Day Smoker     Packs/day: 0.50    Smokeless tobacco: Never Used   Vaping Use    Vaping Use: Never used   Substance and Sexual Activity    Alcohol use: No     Comment: social    Drug use: No    Sexual activity: None   Other Topics Concern    None   Social History Narrative    None     Social Determinants of Health     Financial Resource Strain:     Difficulty of Paying Living Expenses: Not on file   Food Insecurity:     Worried About Running Out of Food in the Last Year: Not on file    Tatiana of Food in the Last Year: Not on file   Transportation Needs:     Lack of Transportation (Medical): Not on file    Lack of Transportation (Non-Medical):  Not on file   Physical Activity:     Days of Exercise per Week: Not on file    Minutes of Exercise per Session: Not on file   Stress:     Feeling of Stress : Not on file   Social Connections:     Frequency of Communication with Friends and Family: Not on file    Frequency of Social Gatherings with Friends and Family: Not on file    Attends Anabaptist Services: Not on file    Active Member of [address] [place] [address] [employer] or Organizations: Not on file    Attends Club or Organization Meetings: Not on file    Marital Status: Not on file   Intimate Partner Violence:     Fear of Current or Ex-Partner: Not on file    Emotionally Abused: Not on file    Physically Abused: Not on file    Sexually Abused: Not on file   Housing Stability:     Unable to Pay for Housing in the Last Year: Not on file    Number of [place] in the Last Year: Not on file    Unstable Housing in the Last Year: Not on file         **Past medical, family and social histories, and nursing notes reviewed and verified by me**      PHYSICAL EXAM  VITAL SIGNS:   ED Triage Vitals   Enc Vitals Group      BP 03/16/22 0842 121/83      Pulse 03/16/22 0842 83      Resp 03/16/22 0842 16      Temp 03/16/22 0842 99.5 °F (37.5 °C)      Temp Source 03/16/22 0842 Oral      SpO2 03/16/22 0842 95 %      Weight 03/16/22 0837 193 lb (87.5 kg)      Height 03/16/22 0837 5' (1.524 m)      Head Circumference --       Peak Flow --       Pain Score --       Pain Loc --       Pain Edu? --       Excl. in [address] during ED course were reviewed and are as charted. Constitutional: Minimal distress, Non-toxic appearance  Eyes: Conjunctiva normal, No discharge  HENT: Extensive diffuse dental decay large fluctuant area of swelling in the left hard palate surrounding teeth numbers 11 through 13, otherwise posterior oropharynx is nonerythematous and without exudate, no tonsillar enlargement, uvula is midline, oropharynx moist  Neck: Supple, no stridor, no grossly visible or palpable masses  Cardiovascular: Regular rate and rhythm, No murmurs, No rubs, No gallops  Pulmonary/Chest: Normal breath sounds, No respiratory distress or accessory muscle use, No wheezing, crackles or rhonchi. Abdomen: Soft, nondistended and nonrigid, No tenderness or peritoneal signs  Neurologic: Normal motor function, Normal sensory function, No focal deficits  Skin: Warm, Dry, No erythema, No rash, No cyanosis, No mottling  Lymphatic: No lymphadenopathy in the following location(s): cervical  Psychiatric: Alert and oriented x3, Affect normal        RADIOLOGY/PROCEDURES/LABS/MEDICATIONS ADMINISTERED:    I have reviewed and interpreted all of the currently available lab results from this visit (if applicable):  No results found for this visit on 03/16/22. ABNORMAL LABS:  Labs Reviewed - No data to display      IMAGING STUDIES ORDERED:  None      No orders to display         MEDICATIONS ADMINISTERED:  Medications - No data to display      Incision and Drainage Procedure Note    Indication: Abscess    Procedure:  The patient was positioned appropriately and the skin over the incision site was prepped with chlorhexidine. Local anesthesia was obtained by infiltration using 0.5% Bupivacaine without epinephrine. An incision was then made over the greatest area of fluctuance and approximately 3 cc of purulent material was expressed. Loculations were not present. The patient tolerated the procedure well. Complications: None          COURSE & MEDICAL DECISION MAKING  Last vitals: /83   Pulse 83   Temp 99.5 °F (37.5 °C) (Oral)   Resp 16   Ht 5' (1.524 m)   Wt 193 lb (87.5 kg)   SpO2 95%   BMI 37.69 kg/m²     79-year-old female with dental abscess. This was successfully incised and drained. Clinical evidence of a suggest acute necrotizing infection. Will start her on clindamycin and provide a list of local dental clinics. Differential diagnoses considered included, but were not limited to, ANUG, deep space infection (e.g., Bryce Holiday angina or retropharyngeal abscess), bacterial meningitis, cavernous sinus thrombosis, or airway compromise, thus I consider the discharge disposition reasonable. I estimate there is LOW risk for these. Additional work-up and treatment as documented above. Patient will be discharged home with advice to follow-up with dental care provider. I have given explicit return precautions. Patient acknowledged understanding of these. Discharged in stable condition. Clinical Impression:  1.  Dental abscess        Disposition referral (if applicable):  A dentist          [facility]  [address] [place] [address]  [phone]    If symptoms worsen      Disposition medications (if applicable):  New Prescriptions    CLINDAMYCIN (CLEOCIN) 150 MG CAPSULE    Take 3 capsules by mouth 3 times daily for 10 days       ED Provider Disposition Time  DISPOSITION Decision To Discharge 03/16/2022 09:08:08 AM          Electronically signed by: Kate Neil M.D., 3/16/2022 9:27 AM      This dictation was created with voice recognition software. While attempts have been made to review the dictation as it is transcribed, on occasion the spoken word can be misinterpreted by the technology leading to omissions or inappropriate words, phrases or sentences.         Ruby Gottron, MD  03/16/22 5922

## 2022-03-23 ENCOUNTER — OFFICE VISIT (OUTPATIENT)
Dept: ORTHOPEDIC SURGERY | Age: 57
End: 2022-03-23
Payer: MEDICARE

## 2022-03-23 VITALS
BODY MASS INDEX: 37.89 KG/M2 | HEIGHT: 60 IN | HEART RATE: 76 BPM | WEIGHT: 193 LBS | RESPIRATION RATE: 15 BRPM | OXYGEN SATURATION: 98 %

## 2022-03-23 DIAGNOSIS — M17.12 PRIMARY OSTEOARTHRITIS OF LEFT KNEE: Primary | ICD-10-CM

## 2022-03-23 DIAGNOSIS — M17.11 PRIMARY OSTEOARTHRITIS OF RIGHT KNEE: ICD-10-CM

## 2022-03-23 PROCEDURE — 20610 DRAIN/INJ JOINT/BURSA W/O US: CPT | Performed by: ORTHOPAEDIC SURGERY

## 2022-03-23 PROCEDURE — 99024 POSTOP FOLLOW-UP VISIT: CPT | Performed by: ORTHOPAEDIC SURGERY

## 2022-03-23 ASSESSMENT — ENCOUNTER SYMPTOMS
BACK PAIN: 0
COLOR CHANGE: 0

## 2022-03-23 NOTE — PROGRESS NOTES
Patient returns to the office today for gel injection #3 today. Pt states pain today is a 4/10. Pt states the knees are starting to feel a little better today.

## 2022-03-23 NOTE — PROGRESS NOTES
Subjective:      Patient ID: Liz Tovar is a 61 y.o. female. Patient returns to the office today for gel injection #3 today. Pt states pain today is a 4/10. Pt states the knees are starting to feel a little better today. She comes in today for her third set of bilateral Orthovisc injections. She states that she has noticed some continued improvement since the second set of injections. She states that she does continue to have grinding both of the knees. Over the past couple of weeks she has begun having dull, aching pain in both of her knees again, left worse than right. Patient denies any new injury to the involved extremity/ joint, denies numbness or tingling in the involved extremity and denies fever or chills. Knee Pain   Pertinent negatives include no numbness. Review of Systems   Constitutional: Negative for activity change, chills and fever. Musculoskeletal: Positive for arthralgias. Negative for back pain, gait problem, joint swelling and myalgias. Skin: Negative for color change, pallor, rash and wound. Neurological: Negative for weakness and numbness. Past Medical History:   Diagnosis Date    Hypertension     Thyroid disease        Objective:   Physical Exam  Constitutional:       Appearance: She is well-developed. HENT:      Head: Normocephalic and atraumatic. Eyes:      Pupils: Pupils are equal, round, and reactive to light. Musculoskeletal:         General: Tenderness present. No deformity. Normal range of motion. Cervical back: Normal range of motion. Right hip: Normal.      Left hip: Normal.      Right knee: No swelling, deformity, effusion, erythema, ecchymosis, lacerations or bony tenderness. Normal range of motion. Tenderness present over the medial joint line. No lateral joint line, MCL or LCL tenderness. No LCL laxity or MCL laxity. Normal alignment and normal patellar mobility.       Left knee: No swelling, deformity, effusion, erythema, ecchymosis, lacerations or bony tenderness. Normal range of motion. Tenderness present over the medial joint line. No lateral joint line, MCL, LCL or patellar tendon tenderness. No LCL laxity or MCL laxity. Normal alignment, normal meniscus and normal patellar mobility. Skin:     General: Skin is warm and dry. Coloration: Skin is not pale. Findings: No erythema or rash. Neurological:      Mental Status: She is alert and oriented to person, place, and time. Sensory: No sensory deficit. Right knee-Skin intact with no erythema, ecchymosis or lacerations present. 0-140    Left knee-Skin intact with no erythema, ecchymosis or lacerations present. 0-140      Xr Knee Right (3 Views)    Result Date: 8/26/2020  XRAY X-ray 3 views of the right knee reviewed by me today in the office demonstrates age appropriate bone density throughout with moderate to severe degenerative changes with moderate medial and patellofemoral joint space narrowing, moderate osteophyte formation in all 3 compartments as well as posterior, normal tracking the patella, no acute osseous abnormalities. Impression: Moderate to severe degenerative changes of the right knee as above with no acute process. Xr Knee Left (3 Views)    Result Date: 5/5/2021  XRAY X-ray 3 views of the left knee obtained and reviewed by me today in the office demonstrates age appropriate bone density throughout with severe degenerative changes with medial joint space collapse, moderate lateral and patellofemoral joint space narrowing with early osteophyte formation in all 3 compartments, normal tracking patella, no acute osseous abnormalities. Impression: Severe degenerative changes of the left knee as above with no acute process. Assessment:       Right knee DJD, moderate to severe  Left knee DJD, moderate to severe      Plan:         I did perform her third set of bilateral Orthovisc injections today.   When the injections quit working the next step would be to consider knee replacement surgery. Continue weight-bearing as tolerated. Continue range of motion exercises as instructed. Ice and elevate as needed. Tylenol or Motrin for pain. Follow up in 6 months for next set of injections. Her right knee injection was slightly lower at the insertion site. Her left knee injection was just below the vessel and her lateral skin and slightly higher than the right knee injection site. Knee Injection Procedure Note     Pre-operative Diagnosis: Right knee DJD     Post-operative Diagnosis: same     Indications: Symptom relief from osteoarthritis     Anesthesia: not required     Procedure Details   Verbal consent was obtained for the procedure. The joint was prepped with alcohol. A 22 gauge needle was inserted into the anterior aspect of the joint from a lateral approach. Orthovisc syringe injected. The needle was removed and the area cleansed and dressed. Complications: None; patient tolerated the procedure well. Knee Injection Procedure Note     Pre-operative Diagnosis: Left knee DJD     Post-operative Diagnosis: same     Indications: Symptom relief from osteoarthritis     Anesthesia: not required     Procedure Details   Verbal consent was obtained for the procedure. The joint was prepped with alcohol. A 22 gauge needle was inserted into the anterior aspect of the joint from a lateral approach. Orthovisc syringe injected. The needle was removed and the area cleansed and dressed. Complications: None; patient tolerated the procedure well.                 Brendan 97, DO

## 2022-10-05 ENCOUNTER — OFFICE VISIT (OUTPATIENT)
Dept: ORTHOPEDIC SURGERY | Age: 57
End: 2022-10-05
Payer: MEDICARE

## 2022-10-05 VITALS
HEIGHT: 60 IN | SYSTOLIC BLOOD PRESSURE: 134 MMHG | RESPIRATION RATE: 15 BRPM | BODY MASS INDEX: 37.89 KG/M2 | DIASTOLIC BLOOD PRESSURE: 82 MMHG | WEIGHT: 193 LBS

## 2022-10-05 DIAGNOSIS — M17.11 PRIMARY OSTEOARTHRITIS OF RIGHT KNEE: ICD-10-CM

## 2022-10-05 DIAGNOSIS — M17.12 PRIMARY OSTEOARTHRITIS OF LEFT KNEE: Primary | ICD-10-CM

## 2022-10-05 PROCEDURE — 20610 DRAIN/INJ JOINT/BURSA W/O US: CPT | Performed by: ORTHOPAEDIC SURGERY

## 2022-10-05 ASSESSMENT — ENCOUNTER SYMPTOMS
BACK PAIN: 0
COLOR CHANGE: 0

## 2022-10-05 NOTE — PATIENT INSTRUCTIONS
Continue to weight bear as tolerated  Continue range of motion   Ice and elevate as needed  Tylenol or motrin for pain   Injection given today in office   Rest for 24-48 hours  Follow up next week

## 2022-10-05 NOTE — PROGRESS NOTES
Patient presents to the office today for FU of the bilateral gel injections given on 3/23/22. Pt states the injections did help with her pain. Pt states that she started to notice pain increase with in the last few weeks.  Pt states L>R

## 2022-10-05 NOTE — PROGRESS NOTES
Subjective:      Patient ID: Ruddy Malhotra is a 61 y.o. female. Patient presents to the office today for FU of the bilateral gel injections given on 3/23/22. Pt states the injections did help with her pain. Pt states that she started to notice pain increase with in the last few weeks. Pt states L>R     She comes in today for her next set of bilateral Orthovisc injections. She states that since her last set of injections she has been doing very well and has not had much pain. She states that she does continue to have grinding both of the knees. Over the past 2 to 3 weeks she has begun having dull, aching pain in both of her knees again, left worse than right. Patient denies any new injury to the involved extremity/ joint, denies numbness or tingling in the involved extremity and denies fever or chills. Knee Pain   Pertinent negatives include no numbness. Review of Systems   Constitutional:  Negative for activity change, chills and fever. Musculoskeletal:  Positive for arthralgias. Negative for back pain, gait problem, joint swelling and myalgias. Skin:  Negative for color change, pallor, rash and wound. Neurological:  Negative for weakness and numbness. Past Medical History:   Diagnosis Date    Hypertension     Thyroid disease        Objective:   Physical Exam  Constitutional:       Appearance: She is well-developed. HENT:      Head: Normocephalic and atraumatic. Eyes:      Pupils: Pupils are equal, round, and reactive to light. Musculoskeletal:         General: Tenderness present. No deformity. Normal range of motion. Cervical back: Normal range of motion. Right hip: Normal.      Left hip: Normal.      Right knee: No swelling, deformity, effusion, erythema, ecchymosis, lacerations or bony tenderness. Normal range of motion. Tenderness present over the medial joint line. No lateral joint line, MCL or LCL tenderness. No LCL laxity or MCL laxity.  Normal alignment and normal patellar mobility. Left knee: No swelling, deformity, effusion, erythema, ecchymosis, lacerations or bony tenderness. Normal range of motion. Tenderness present over the medial joint line. No lateral joint line, MCL, LCL or patellar tendon tenderness. No LCL laxity or MCL laxity. Normal alignment, normal meniscus and normal patellar mobility. Skin:     General: Skin is warm and dry. Coloration: Skin is not pale. Findings: No erythema or rash. Neurological:      Mental Status: She is alert and oriented to person, place, and time. Sensory: No sensory deficit. Right knee-Skin intact with no erythema, ecchymosis or lacerations present. 0-140    Left knee-Skin intact with no erythema, ecchymosis or lacerations present. 0-140      Xr Knee Right (3 Views)    Result Date: 8/26/2020  XRAY X-ray 3 views of the right knee reviewed by me today in the office demonstrates age appropriate bone density throughout with moderate to severe degenerative changes with moderate medial and patellofemoral joint space narrowing, moderate osteophyte formation in all 3 compartments as well as posterior, normal tracking the patella, no acute osseous abnormalities. Impression: Moderate to severe degenerative changes of the right knee as above with no acute process. Xr Knee Left (3 Views)    Result Date: 5/5/2021  XRAY X-ray 3 views of the left knee obtained and reviewed by me today in the office demonstrates age appropriate bone density throughout with severe degenerative changes with medial joint space collapse, moderate lateral and patellofemoral joint space narrowing with early osteophyte formation in all 3 compartments, normal tracking patella, no acute osseous abnormalities. Impression: Severe degenerative changes of the left knee as above with no acute process.            Assessment:       Right knee DJD, moderate to severe  Left knee DJD, moderate to severe      Plan:       I discussed with her today the mechanics of the Orthovisc injections for her knees. I did perform her first set of bilateral Orthovisc injections today. When the injections quit working the next step would be to consider knee replacement surgery. Continue weight-bearing as tolerated. Continue range of motion exercises as instructed. Ice and elevate as needed. Tylenol or Motrin for pain. Follow up in 1 week for second set of injections. Knee Injection Procedure Note     Pre-operative Diagnosis: Right knee DJD     Post-operative Diagnosis: same     Indications: Symptom relief from osteoarthritis     Anesthesia: not required     Procedure Details   Verbal consent was obtained for the procedure. The joint was prepped with alcohol. A 22 gauge needle was inserted into the anterior aspect of the joint from a lateral approach. Orthovisc syringe injected. The needle was removed and the area cleansed and dressed. Complications: None; patient tolerated the procedure well. Knee Injection Procedure Note     Pre-operative Diagnosis: Left knee DJD     Post-operative Diagnosis: same     Indications: Symptom relief from osteoarthritis     Anesthesia: not required     Procedure Details   Verbal consent was obtained for the procedure. The joint was prepped with alcohol. A 22 gauge needle was inserted into the anterior aspect of the joint from a lateral approach. Orthovisc syringe injected. The needle was removed and the area cleansed and dressed. Complications: None; patient tolerated the procedure well.                 Brendan 97, DO

## 2022-10-12 ENCOUNTER — OFFICE VISIT (OUTPATIENT)
Dept: ORTHOPEDIC SURGERY | Age: 57
End: 2022-10-12
Payer: MEDICARE

## 2022-10-12 VITALS
HEIGHT: 60 IN | WEIGHT: 193 LBS | DIASTOLIC BLOOD PRESSURE: 80 MMHG | BODY MASS INDEX: 37.89 KG/M2 | SYSTOLIC BLOOD PRESSURE: 132 MMHG | OXYGEN SATURATION: 94 % | HEART RATE: 102 BPM | RESPIRATION RATE: 15 BRPM

## 2022-10-12 DIAGNOSIS — M17.12 PRIMARY OSTEOARTHRITIS OF LEFT KNEE: Primary | ICD-10-CM

## 2022-10-12 DIAGNOSIS — M17.11 PRIMARY OSTEOARTHRITIS OF RIGHT KNEE: ICD-10-CM

## 2022-10-12 PROCEDURE — 20610 DRAIN/INJ JOINT/BURSA W/O US: CPT | Performed by: ORTHOPAEDIC SURGERY

## 2022-10-12 PROCEDURE — 99024 POSTOP FOLLOW-UP VISIT: CPT | Performed by: ORTHOPAEDIC SURGERY

## 2022-10-12 ASSESSMENT — ENCOUNTER SYMPTOMS
BACK PAIN: 0
COLOR CHANGE: 0

## 2022-10-12 NOTE — PROGRESS NOTES
Subjective:      Patient ID: Ruby Jacob is a 61 y.o. female. She comes in today for her second set of bilateral Orthovisc injections. She states that she has noticed some mild improvement in her symptoms since the first set of injections. She states that she does continue to have grinding both of the knees. Over the past 2 to 3 weeks she has begun having dull, aching pain in both of her knees again, left worse than right. Patient denies any new injury to the involved extremity/ joint, denies numbness or tingling in the involved extremity and denies fever or chills. Knee Pain   Pertinent negatives include no numbness. Review of Systems   Constitutional:  Negative for activity change, chills and fever. Musculoskeletal:  Positive for arthralgias. Negative for back pain, gait problem, joint swelling and myalgias. Skin:  Negative for color change, pallor, rash and wound. Neurological:  Negative for weakness and numbness. Past Medical History:   Diagnosis Date    Hypertension     Thyroid disease        Objective:   Physical Exam  Constitutional:       Appearance: She is well-developed. HENT:      Head: Normocephalic and atraumatic. Eyes:      Pupils: Pupils are equal, round, and reactive to light. Musculoskeletal:         General: Tenderness present. No deformity. Normal range of motion. Cervical back: Normal range of motion. Right hip: Normal.      Left hip: Normal.      Right knee: No swelling, deformity, effusion, erythema, ecchymosis, lacerations or bony tenderness. Normal range of motion. Tenderness present over the medial joint line. No lateral joint line, MCL or LCL tenderness. No LCL laxity or MCL laxity. Normal alignment and normal patellar mobility. Left knee: No swelling, deformity, effusion, erythema, ecchymosis, lacerations or bony tenderness. Normal range of motion. Tenderness present over the medial joint line.  No lateral joint line, MCL, LCL or patellar tendon tenderness. No LCL laxity or MCL laxity. Normal alignment, normal meniscus and normal patellar mobility. Skin:     General: Skin is warm and dry. Coloration: Skin is not pale. Findings: No erythema or rash. Neurological:      Mental Status: She is alert and oriented to person, place, and time. Sensory: No sensory deficit. Right knee-Skin intact with no erythema, ecchymosis or lacerations present. 0-140    Left knee-Skin intact with no erythema, ecchymosis or lacerations present. 0-140      Xr Knee Right (3 Views)    Result Date: 8/26/2020  XRAY X-ray 3 views of the right knee reviewed by me today in the office demonstrates age appropriate bone density throughout with moderate to severe degenerative changes with moderate medial and patellofemoral joint space narrowing, moderate osteophyte formation in all 3 compartments as well as posterior, normal tracking the patella, no acute osseous abnormalities. Impression: Moderate to severe degenerative changes of the right knee as above with no acute process. Xr Knee Left (3 Views)    Result Date: 5/5/2021  XRAY X-ray 3 views of the left knee obtained and reviewed by me today in the office demonstrates age appropriate bone density throughout with severe degenerative changes with medial joint space collapse, moderate lateral and patellofemoral joint space narrowing with early osteophyte formation in all 3 compartments, normal tracking patella, no acute osseous abnormalities. Impression: Severe degenerative changes of the left knee as above with no acute process. Assessment:       Right knee DJD, moderate to severe  Left knee DJD, moderate to severe      Plan:       I discussed with her today her response to the first set of Orthovisc injections for her knees. I did perform her second set of bilateral Orthovisc injections today.   When the injections quit working the next step would be to consider knee replacement surgery. Continue weight-bearing as tolerated. Continue range of motion exercises as instructed. Ice and elevate as needed. Tylenol or Motrin for pain. Follow up in 1 week for third set of injections. Knee Injection Procedure Note     Pre-operative Diagnosis: Right knee DJD     Post-operative Diagnosis: same     Indications: Symptom relief from osteoarthritis     Anesthesia: not required     Procedure Details   Verbal consent was obtained for the procedure. The joint was prepped with alcohol. A 22 gauge needle was inserted into the anterior aspect of the joint from a lateral approach. Orthovisc syringe injected. The needle was removed and the area cleansed and dressed. Complications: None; patient tolerated the procedure well. Knee Injection Procedure Note     Pre-operative Diagnosis: Left knee DJD     Post-operative Diagnosis: same     Indications: Symptom relief from osteoarthritis     Anesthesia: not required     Procedure Details   Verbal consent was obtained for the procedure. The joint was prepped with alcohol. A 22 gauge needle was inserted into the anterior aspect of the joint from a lateral approach. Orthovisc syringe injected. The needle was removed and the area cleansed and dressed. Complications: None; patient tolerated the procedure well.                 Brendan 97, DO

## 2022-10-12 NOTE — PATIENT INSTRUCTIONS
Continue weight-bearing as tolerated. Continue range of motion exercises as instructed. Ice and elevate as needed. Tylenol or Motrin for pain.    Gel injection given today in the bilateral knees  Follow up in one week

## 2022-10-19 ENCOUNTER — OFFICE VISIT (OUTPATIENT)
Dept: ORTHOPEDIC SURGERY | Age: 57
End: 2022-10-19
Payer: MEDICARE

## 2022-10-19 VITALS
HEIGHT: 60 IN | BODY MASS INDEX: 37.89 KG/M2 | WEIGHT: 193 LBS | DIASTOLIC BLOOD PRESSURE: 78 MMHG | SYSTOLIC BLOOD PRESSURE: 112 MMHG | RESPIRATION RATE: 15 BRPM

## 2022-10-19 DIAGNOSIS — M17.11 PRIMARY OSTEOARTHRITIS OF RIGHT KNEE: ICD-10-CM

## 2022-10-19 DIAGNOSIS — M17.12 PRIMARY OSTEOARTHRITIS OF LEFT KNEE: Primary | ICD-10-CM

## 2022-10-19 PROCEDURE — 99024 POSTOP FOLLOW-UP VISIT: CPT | Performed by: ORTHOPAEDIC SURGERY

## 2022-10-19 PROCEDURE — 20610 DRAIN/INJ JOINT/BURSA W/O US: CPT | Performed by: ORTHOPAEDIC SURGERY

## 2022-10-19 ASSESSMENT — ENCOUNTER SYMPTOMS
BACK PAIN: 0
COLOR CHANGE: 0

## 2022-10-19 NOTE — PATIENT INSTRUCTIONS
Continue weight-bearing as tolerated. Continue range of motion exercises as instructed. Ice and elevate as needed. Tylenol or Motrin for pain.    Gel injection #3 given today in the bilateral knees  Follow up as needed

## 2022-10-19 NOTE — PROGRESS NOTES
Subjective:      Patient ID: Jimena De La Torre is a 61 y.o. female. She comes in today for her third set of bilateral Orthovisc injections. She states that she has noticed some continued improvement in her symptoms since the first set of injections. She states that she does continue to have grinding both of the knees. Over the past 2 to 3 weeks she has begun having dull, aching pain in both of her knees again, left worse than right. Patient denies any new injury to the involved extremity/ joint, denies numbness or tingling in the involved extremity and denies fever or chills. Knee Pain   Pertinent negatives include no numbness. Review of Systems   Constitutional:  Negative for activity change, chills and fever. Musculoskeletal:  Positive for arthralgias. Negative for back pain, gait problem, joint swelling and myalgias. Skin:  Negative for color change, pallor, rash and wound. Neurological:  Negative for weakness and numbness. Past Medical History:   Diagnosis Date    Hypertension     Thyroid disease        Objective:   Physical Exam  Constitutional:       Appearance: She is well-developed. HENT:      Head: Normocephalic and atraumatic. Eyes:      Pupils: Pupils are equal, round, and reactive to light. Musculoskeletal:         General: Tenderness present. No deformity. Normal range of motion. Cervical back: Normal range of motion. Right hip: Normal.      Left hip: Normal.      Right knee: No swelling, deformity, effusion, erythema, ecchymosis, lacerations or bony tenderness. Normal range of motion. Tenderness present over the medial joint line. No lateral joint line, MCL or LCL tenderness. No LCL laxity or MCL laxity. Normal alignment and normal patellar mobility. Left knee: No swelling, deformity, effusion, erythema, ecchymosis, lacerations or bony tenderness. Normal range of motion. Tenderness present over the medial joint line.  No lateral joint line, MCL, LCL or patellar tendon tenderness. No LCL laxity or MCL laxity. Normal alignment, normal meniscus and normal patellar mobility. Skin:     General: Skin is warm and dry. Coloration: Skin is not pale. Findings: No erythema or rash. Neurological:      Mental Status: She is alert and oriented to person, place, and time. Sensory: No sensory deficit. Right knee-Skin intact with no erythema, ecchymosis or lacerations present. 0-140    Left knee-Skin intact with no erythema, ecchymosis or lacerations present. 0-140      Xr Knee Right (3 Views)    Result Date: 8/26/2020  XRAY X-ray 3 views of the right knee reviewed by me today in the office demonstrates age appropriate bone density throughout with moderate to severe degenerative changes with moderate medial and patellofemoral joint space narrowing, moderate osteophyte formation in all 3 compartments as well as posterior, normal tracking the patella, no acute osseous abnormalities. Impression: Moderate to severe degenerative changes of the right knee as above with no acute process. Xr Knee Left (3 Views)    Result Date: 5/5/2021  XRAY X-ray 3 views of the left knee obtained and reviewed by me today in the office demonstrates age appropriate bone density throughout with severe degenerative changes with medial joint space collapse, moderate lateral and patellofemoral joint space narrowing with early osteophyte formation in all 3 compartments, normal tracking patella, no acute osseous abnormalities. Impression: Severe degenerative changes of the left knee as above with no acute process. Assessment:       Right knee DJD, moderate to severe  Left knee DJD, moderate to severe      Plan:       I discussed with her today her response to the second set of Orthovisc injections for her knees. I did perform her third set of bilateral Orthovisc injections today.   I explained to her that we can repeat these injections every 6 months as needed. When the injections quit working the next step would be to consider knee replacement surgery. Continue weight-bearing as tolerated. Continue range of motion exercises as instructed. Ice and elevate as needed. Tylenol or Motrin for pain. Follow up 6 months to begin another set of injections. Knee Injection Procedure Note     Pre-operative Diagnosis: Right knee DJD     Post-operative Diagnosis: same     Indications: Symptom relief from osteoarthritis     Anesthesia: not required     Procedure Details   Verbal consent was obtained for the procedure. The joint was prepped with alcohol. A 22 gauge needle was inserted into the anterior aspect of the joint from a lateral approach. Orthovisc syringe injected. The needle was removed and the area cleansed and dressed. Complications: None; patient tolerated the procedure well. Knee Injection Procedure Note     Pre-operative Diagnosis: Left knee DJD     Post-operative Diagnosis: same     Indications: Symptom relief from osteoarthritis     Anesthesia: not required     Procedure Details   Verbal consent was obtained for the procedure. The joint was prepped with alcohol. A 22 gauge needle was inserted into the anterior aspect of the joint from a lateral approach. Orthovisc syringe injected. The needle was removed and the area cleansed and dressed. Complications: None; patient tolerated the procedure well.                 Brendan 97, DO

## 2022-11-28 ENCOUNTER — HOSPITAL ENCOUNTER (EMERGENCY)
Age: 57
Discharge: HOME | End: 2022-11-28
Attending: EMERGENCY MEDICINE
Payer: MEDICARE

## 2022-11-28 VITALS
HEART RATE: 56 BPM | WEIGHT: 191 LBS | BODY MASS INDEX: 37.3 KG/M2 | TEMPERATURE: 97.8 F | DIASTOLIC BLOOD PRESSURE: 70 MMHG | SYSTOLIC BLOOD PRESSURE: 138 MMHG | OXYGEN SATURATION: 96 % | RESPIRATION RATE: 16 BRPM

## 2022-11-28 DIAGNOSIS — R19.7 NAUSEA VOMITING AND DIARRHEA: Primary | ICD-10-CM

## 2022-11-28 DIAGNOSIS — R11.2 NAUSEA VOMITING AND DIARRHEA: Primary | ICD-10-CM

## 2022-11-28 LAB
ANION GAP SERPL CALCULATED.3IONS-SCNC: 13 MMOL/L (ref 4–16)
BASOPHILS ABSOLUTE: 0 K/CU MM
BASOPHILS RELATIVE PERCENT: 0.2 % (ref 0–1)
BUN BLDV-MCNC: 18 MG/DL (ref 6–23)
CALCIUM SERPL-MCNC: 8.5 MG/DL (ref 8.3–10.6)
CHLORIDE BLD-SCNC: 96 MMOL/L (ref 99–110)
CO2: 27 MMOL/L (ref 21–32)
CREAT SERPL-MCNC: 0.7 MG/DL (ref 0.6–1.1)
DIFFERENTIAL TYPE: ABNORMAL
EOSINOPHILS ABSOLUTE: 0 K/CU MM
EOSINOPHILS RELATIVE PERCENT: 0 % (ref 0–3)
GFR SERPL CREATININE-BSD FRML MDRD: >60 ML/MIN/1.73M2
GLUCOSE BLD-MCNC: 112 MG/DL (ref 70–99)
HCT VFR BLD CALC: 40.8 % (ref 37–47)
HEMOGLOBIN: 13.7 GM/DL (ref 12.5–16)
IMMATURE NEUTROPHIL %: 0.2 % (ref 0–0.43)
LYMPHOCYTES ABSOLUTE: 1 K/CU MM
LYMPHOCYTES RELATIVE PERCENT: 11.2 % (ref 24–44)
MCH RBC QN AUTO: 29.7 PG (ref 27–31)
MCHC RBC AUTO-ENTMCNC: 33.6 % (ref 32–36)
MCV RBC AUTO: 88.5 FL (ref 78–100)
MONOCYTES ABSOLUTE: 0.8 K/CU MM
MONOCYTES RELATIVE PERCENT: 9 % (ref 0–4)
PDW BLD-RTO: 15 % (ref 11.7–14.9)
PLATELET # BLD: 245 K/CU MM (ref 140–440)
PMV BLD AUTO: 10.3 FL (ref 7.5–11.1)
POTASSIUM SERPL-SCNC: 3.6 MMOL/L (ref 3.5–5.1)
RBC # BLD: 4.61 M/CU MM (ref 4.2–5.4)
SEGMENTED NEUTROPHILS ABSOLUTE COUNT: 6.9 K/CU MM
SEGMENTED NEUTROPHILS RELATIVE PERCENT: 79.4 % (ref 36–66)
SODIUM BLD-SCNC: 136 MMOL/L (ref 135–145)
TOTAL IMMATURE NEUTOROPHIL: 0.02 K/CU MM
WBC # BLD: 8.7 K/CU MM (ref 4–10.5)

## 2022-11-28 PROCEDURE — 6360000002 HC RX W HCPCS: Performed by: EMERGENCY MEDICINE

## 2022-11-28 PROCEDURE — 96374 THER/PROPH/DIAG INJ IV PUSH: CPT

## 2022-11-28 PROCEDURE — 2580000003 HC RX 258: Performed by: EMERGENCY MEDICINE

## 2022-11-28 PROCEDURE — 80048 BASIC METABOLIC PNL TOTAL CA: CPT

## 2022-11-28 PROCEDURE — 96375 TX/PRO/DX INJ NEW DRUG ADDON: CPT

## 2022-11-28 PROCEDURE — 99284 EMERGENCY DEPT VISIT MOD MDM: CPT

## 2022-11-28 PROCEDURE — 85025 COMPLETE CBC W/AUTO DIFF WBC: CPT

## 2022-11-28 RX ORDER — METOCLOPRAMIDE HYDROCHLORIDE 5 MG/ML
10 INJECTION INTRAMUSCULAR; INTRAVENOUS ONCE
Status: COMPLETED | OUTPATIENT
Start: 2022-11-28 | End: 2022-11-28

## 2022-11-28 RX ORDER — ONDANSETRON 4 MG/1
4 TABLET, ORALLY DISINTEGRATING ORAL EVERY 8 HOURS PRN
Qty: 15 TABLET | Refills: 0 | Status: SHIPPED | OUTPATIENT
Start: 2022-11-28

## 2022-11-28 RX ORDER — PROMETHAZINE HYDROCHLORIDE 25 MG/1
25 TABLET ORAL EVERY 6 HOURS PRN
Qty: 12 TABLET | Refills: 0 | Status: SHIPPED | OUTPATIENT
Start: 2022-11-28 | End: 2022-12-01

## 2022-11-28 RX ORDER — DIPHENHYDRAMINE HYDROCHLORIDE 50 MG/ML
50 INJECTION INTRAMUSCULAR; INTRAVENOUS ONCE
Status: COMPLETED | OUTPATIENT
Start: 2022-11-28 | End: 2022-11-28

## 2022-11-28 RX ORDER — 0.9 % SODIUM CHLORIDE 0.9 %
1000 INTRAVENOUS SOLUTION INTRAVENOUS ONCE
Status: COMPLETED | OUTPATIENT
Start: 2022-11-28 | End: 2022-11-28

## 2022-11-28 RX ADMIN — DIPHENHYDRAMINE HYDROCHLORIDE 50 MG: 50 INJECTION INTRAMUSCULAR; INTRAVENOUS at 22:47

## 2022-11-28 RX ADMIN — METOCLOPRAMIDE 10 MG: 5 INJECTION, SOLUTION INTRAMUSCULAR; INTRAVENOUS at 22:48

## 2022-11-28 RX ADMIN — SODIUM CHLORIDE 1000 ML: 9 INJECTION, SOLUTION INTRAVENOUS at 22:48

## 2022-11-28 ASSESSMENT — ENCOUNTER SYMPTOMS
EYES NEGATIVE: 1
SORE THROAT: 0
NAUSEA: 1
ABDOMINAL PAIN: 1
DIARRHEA: 1
COUGH: 0
RESPIRATORY NEGATIVE: 1

## 2022-11-28 ASSESSMENT — PAIN - FUNCTIONAL ASSESSMENT: PAIN_FUNCTIONAL_ASSESSMENT: 0-10

## 2022-11-28 ASSESSMENT — PAIN SCALES - GENERAL: PAINLEVEL_OUTOF10: 5

## 2022-11-28 ASSESSMENT — PAIN DESCRIPTION - LOCATION: LOCATION: ABDOMEN

## 2022-11-29 NOTE — ED NOTES
Discharged with instructions and rx x 2. Pt acknowledges understanding. Ambulatory at discharge.       Gayla Toribio RN  11/28/22 [id number]

## 2022-11-29 NOTE — ED PROVIDER NOTES
Nausea & Vomiting  Severity:  Moderate  Timing:  Constant  Number of daily episodes:  4  Quality:  Stomach contents  Able to tolerate:  Liquids  Progression:  Worsening  Chronicity:  New  Recent urination:  Decreased  Relieved by:  Nothing  Worsened by:  Nothing  Ineffective treatments:  None tried  Associated symptoms: abdominal pain and diarrhea    Associated symptoms: no arthralgias, no chills, no cough, no fever, no headaches, no myalgias, no sore throat and no URI    Abdominal pain:     Location:  Generalized    Quality: cramping    Diarrhea:     Quality:  Watery    Severity:  Moderate    Timing:  Constant    Progression:  Unchanged  Risk factors: sick contacts      Review of Systems   Constitutional: Negative. Negative for chills and fever. HENT: Negative. Negative for sore throat. Eyes: Negative. Respiratory: Negative. Negative for cough. Cardiovascular: Negative. Gastrointestinal:  Positive for abdominal pain, diarrhea and nausea. Genitourinary: Negative. Musculoskeletal: Negative. Negative for arthralgias and myalgias. Skin: Negative. Neurological: Negative. Negative for headaches. All other systems reviewed and are negative. History reviewed. No pertinent family history.   Social History     Socioeconomic History    Marital status:      Spouse name: Not on file    Number of children: Not on file    Years of education: Not on file    Highest education level: Not on file   Occupational History    Not on file   Tobacco Use    Smoking status: Every Day     Packs/day: 0.50     Types: Cigarettes    Smokeless tobacco: Never   Vaping Use    Vaping Use: Never used   Substance and Sexual Activity    Alcohol use: No     Comment: social    Drug use: No    Sexual activity: Not on file   Other Topics Concern    Not on file   Social History Narrative    Not on file     Social Determinants of Health     Financial Resource Strain: Not on file   Food Insecurity: Not on file Transportation Needs: Not on file   Physical Activity: Not on file   Stress: Not on file   Social Connections: Not on file   Intimate Partner Violence: Not on file   Housing Stability: Not on file     Past Surgical History:   Procedure Laterality Date    ABDOMEN SURGERY      HYSTERECTOMY (CERVIX STATUS UNKNOWN)       Past Medical History:   Diagnosis Date    Hypertension     Thyroid disease      No Known Allergies  Prior to Admission medications    Medication Sig Start Date End Date Taking? Authorizing Provider   ondansetron (ZOFRAN ODT) 4 MG disintegrating tablet Take 1 tablet by mouth every 8 hours as needed for Nausea 11/28/22  Yes Florette Pump Ray, DO   promethazine (PHENERGAN) 25 MG tablet Take 1 tablet by mouth every 6 hours as needed for Nausea 11/28/22 12/1/22 Yes Florette Pump Ray, DO   lisinopril-hydroCHLOROthiazide (PRINZIDE;ZESTORETIC) 20-25 MG per tablet  2/8/22   Historical Provider, MD   ibuprofen (ADVIL;MOTRIN) 800 MG tablet Take 800 mg by mouth every 6 hours as needed for Pain  Patient not taking: Reported on 11/28/2022    Historical Provider, MD   Melatonin 10 MG TABS Take by mouth    Historical Provider, MD   Cholecalciferol (VITAMIN D3 PO) Take by mouth  Patient not taking: Reported on 11/28/2022    Historical Provider, MD   furosemide (LASIX) 10 MG/ML solution Take by mouth daily    Historical Provider, MD   levothyroxine (SYNTHROID) 125 MCG tablet Take 125 mcg by mouth Daily    Historical Provider, MD       BP (!) 149/72   Pulse 60   Temp 99.1 °F (37.3 °C) (Oral)   Resp 18   Wt 191 lb (86.6 kg)   SpO2 95%   BMI 37.30 kg/m²     Physical Exam  Vitals and nursing note reviewed. Constitutional:       Appearance: She is well-developed. She is ill-appearing. HENT:      Head: Normocephalic and atraumatic.       Right Ear: External ear normal.      Left Ear: External ear normal.      Nose: Nose normal.   Eyes:      Conjunctiva/sclera: Conjunctivae normal.      Pupils: Pupils are equal, round, and reactive to light. Cardiovascular:      Rate and Rhythm: Normal rate and regular rhythm. Heart sounds: Normal heart sounds. Pulmonary:      Effort: Pulmonary effort is normal.      Breath sounds: Normal breath sounds. Abdominal:      General: Bowel sounds are normal. There is no distension. Palpations: Abdomen is soft. Tenderness: There is no abdominal tenderness. There is no guarding or rebound. Musculoskeletal:         General: Normal range of motion. Cervical back: Normal range of motion and neck supple. Skin:     General: Skin is warm and dry. Neurological:      Mental Status: She is alert and oriented to person, place, and time. GCS: GCS eye subscore is 4. GCS verbal subscore is 5. GCS motor subscore is 6. Psychiatric:         Behavior: Behavior normal.         Thought Content: Thought content normal.         Judgment: Judgment normal.       MDM:    Labs Reviewed   CBC WITH AUTO DIFFERENTIAL - Abnormal; Notable for the following components:       Result Value    RDW 15.0 (*)     Segs Relative 79.4 (*)     Lymphocytes % 11.2 (*)     Monocytes % 9.0 (*)     All other components within normal limits   BASIC METABOLIC PANEL - Abnormal; Notable for the following components:    Chloride 96 (*)     Glucose 112 (*)     All other components within normal limits       No orders to display        Saline lock was established on the patient. The patient was given IV fluids in the emergency department. The patient was also given Reglan and Benadryl to help with her nausea and vomiting while in the emergency department. Patient was able to take 12 ounces of liquid prior to discharge. The patient is also been ambulatory in the emergency department and has walked to the bathroom in order to urinate. Patient had no diarrhea stools while in the emergency department.   Patient will be discharged to home with supportive care discussions on how to keep her self hydrated at home

## 2023-03-13 ENCOUNTER — HOSPITAL ENCOUNTER (OUTPATIENT)
Age: 58
Discharge: HOME | End: 2023-03-13
Payer: MEDICARE

## 2023-03-13 LAB
ALBUMIN SERPL-MCNC: 3.7 GM/DL (ref 3.4–5)
ALP BLD-CCNC: 82 IU/L (ref 40–129)
ALT SERPL-CCNC: 5 U/L (ref 10–40)
ANION GAP SERPL CALCULATED.3IONS-SCNC: 12 MMOL/L (ref 4–16)
AST SERPL-CCNC: 13 IU/L (ref 15–37)
BILIRUB SERPL-MCNC: 0.3 MG/DL (ref 0–1)
BUN SERPL-MCNC: 42 MG/DL (ref 6–23)
CALCIUM SERPL-MCNC: 9.4 MG/DL (ref 8.3–10.6)
CHLORIDE BLD-SCNC: 96 MMOL/L (ref 99–110)
CHOLESTEROL, FASTING: 165 MG/DL
CO2: 31 MMOL/L (ref 21–32)
CREAT SERPL-MCNC: 1 MG/DL (ref 0.6–1.1)
GFR SERPL CREATININE-BSD FRML MDRD: >60 ML/MIN/1.73M2
GLUCOSE P FAST SERPL-MCNC: 97 MG/DL (ref 70–99)
HDLC SERPL-MCNC: 40 MG/DL
LDLC SERPL CALC-MCNC: 101 MG/DL
POTASSIUM SERPL-SCNC: 3.5 MMOL/L (ref 3.5–5.1)
SODIUM BLD-SCNC: 139 MMOL/L (ref 135–145)
TOTAL PROTEIN: 7.7 GM/DL (ref 6.4–8.2)
TRIGLYCERIDE, FASTING: 119 MG/DL
TSH SERPL DL<=0.005 MIU/L-ACNC: 0.16 UIU/ML (ref 0.27–4.2)

## 2023-03-13 PROCEDURE — 80061 LIPID PANEL: CPT

## 2023-03-13 PROCEDURE — 80053 COMPREHEN METABOLIC PANEL: CPT

## 2023-03-13 PROCEDURE — 84443 ASSAY THYROID STIM HORMONE: CPT

## 2023-03-13 PROCEDURE — 36415 COLL VENOUS BLD VENIPUNCTURE: CPT

## 2023-04-19 ENCOUNTER — OFFICE VISIT (OUTPATIENT)
Dept: ORTHOPEDIC SURGERY | Age: 58
End: 2023-04-19

## 2023-04-19 VITALS — RESPIRATION RATE: 15 BRPM | WEIGHT: 191 LBS | BODY MASS INDEX: 37.5 KG/M2 | HEIGHT: 60 IN

## 2023-04-19 DIAGNOSIS — M17.12 PRIMARY OSTEOARTHRITIS OF LEFT KNEE: Primary | ICD-10-CM

## 2023-04-19 DIAGNOSIS — M17.11 PRIMARY OSTEOARTHRITIS OF RIGHT KNEE: ICD-10-CM

## 2023-04-19 ASSESSMENT — ENCOUNTER SYMPTOMS
COLOR CHANGE: 0
BACK PAIN: 0

## 2023-04-19 NOTE — PROGRESS NOTES
Orthovisc injections for her knees. I did perform her first set of bilateral Orthovisc injections today. When the injections quit working the next step would be to consider knee replacement surgery. Continue weight-bearing as tolerated. Continue range of motion exercises as instructed. Ice and elevate as needed. Tylenol or Motrin for pain. Follow up in 1 week for second set of injections. Knee Injection Procedure Note     Pre-operative Diagnosis: Right knee DJD     Post-operative Diagnosis: same     Indications: Symptom relief from osteoarthritis     Anesthesia: not required     Procedure Details   Verbal consent was obtained for the procedure. The joint was prepped with alcohol. A 22 gauge needle was inserted into the anterior aspect of the joint from a lateral approach. Orthovisc syringe injected. The needle was removed and the area cleansed and dressed. Complications: None; patient tolerated the procedure well. Knee Injection Procedure Note     Pre-operative Diagnosis: Left knee DJD     Post-operative Diagnosis: same     Indications: Symptom relief from osteoarthritis     Anesthesia: not required     Procedure Details   Verbal consent was obtained for the procedure. The joint was prepped with alcohol. A 22 gauge needle was inserted into the anterior aspect of the joint from a lateral approach. Orthovisc syringe injected. The needle was removed and the area cleansed and dressed. Complications: None; patient tolerated the procedure well.                 Brendan Harvey, DO

## 2023-04-19 NOTE — PATIENT INSTRUCTIONS
Continue weight-bearing as tolerated. Continue range of motion exercises as instructed. Ice and elevate as needed. Tylenol or Motrin for pain.    Bilateral gel injections given today in the bilateral knee's  Follow up in one week

## 2023-04-26 ENCOUNTER — OFFICE VISIT (OUTPATIENT)
Dept: ORTHOPEDIC SURGERY | Age: 58
End: 2023-04-26

## 2023-04-26 VITALS
SYSTOLIC BLOOD PRESSURE: 116 MMHG | HEART RATE: 72 BPM | OXYGEN SATURATION: 96 % | HEIGHT: 60 IN | DIASTOLIC BLOOD PRESSURE: 66 MMHG | BODY MASS INDEX: 37.3 KG/M2

## 2023-04-26 DIAGNOSIS — M17.12 PRIMARY OSTEOARTHRITIS OF LEFT KNEE: Primary | ICD-10-CM

## 2023-04-26 DIAGNOSIS — M17.11 PRIMARY OSTEOARTHRITIS OF RIGHT KNEE: ICD-10-CM

## 2023-04-26 RX ORDER — PANTOPRAZOLE SODIUM 40 MG/1
40 TABLET, DELAYED RELEASE ORAL DAILY
COMMUNITY

## 2023-04-26 ASSESSMENT — ENCOUNTER SYMPTOMS
BACK PAIN: 0
COLOR CHANGE: 0

## 2023-04-26 NOTE — PATIENT INSTRUCTIONS
Continue weight-bearing as tolerated. Continue range of motion exercises as instructed. Ice and elevate as needed. Tylenol or Motrin for pain. Orthovisc #2 given today in the bilateral knees  Follow up in one week. We are committed to providing you the best care possible. If you receive a survey after visiting one of our offices, please take time to share your experience concerning your physician office visit. These surveys are confidential and no health information about you is shared. We are eager to improve for you and we are counting on your feedback to help make that happen.

## 2023-04-26 NOTE — PROGRESS NOTES
Patient seen in office today for B/L Orthrovisc#2 injections.
bony tenderness. Normal range of motion. Tenderness present over the medial joint line. No lateral joint line, MCL, LCL or patellar tendon tenderness. No LCL laxity or MCL laxity. Normal alignment, normal meniscus and normal patellar mobility. Skin:     General: Skin is warm and dry. Coloration: Skin is not pale. Findings: No erythema or rash. Neurological:      Mental Status: She is alert and oriented to person, place, and time. Sensory: No sensory deficit. Right knee-Skin intact with no erythema, ecchymosis or lacerations present. 0-140    Left knee-Skin intact with no erythema, ecchymosis or lacerations present. 0-140      XR KNEE LEFT (3 VIEWS)    Result Date: 4/19/2023  XRAY X-ray 3 views of the left knee reviewed by me today in the office demonstrates age appropriate bone density throughout with severe degenerative changes with medial and patellofemoral joint space collapse, moderate osteophyte formation in all 3 compartments, normal tracking of the patella, no acute osseous abnormalities. Impression: Severe degenerative changes of the left knee as above with no acute process. XR KNEE RIGHT (3 VIEWS)    Result Date: 4/19/2023  XRAY X-ray 3 views of the right knee reviewed by me today in the office demonstrates age appropriate bone density throughout with severe degenerative changes with medial and patellofemoral joint space collapse, moderate osteophyte formation in all 3 compartments, normal tracking of the patella, no acute osseous abnormalities. Impression: Severe degenerative changes of the right knee as above with no acute process. Assessment:       Right knee DJD, moderate to severe  Left knee DJD, moderate to severe      Plan:       I discussed with her today her response to the Orthovisc injections for her knees. I did perform her second set of bilateral Orthovisc injections today.   When the injections quit working the next step would be to consider knee

## 2023-05-03 ENCOUNTER — OFFICE VISIT (OUTPATIENT)
Dept: ORTHOPEDIC SURGERY | Age: 58
End: 2023-05-03

## 2023-05-03 DIAGNOSIS — M17.12 PRIMARY OSTEOARTHRITIS OF LEFT KNEE: Primary | ICD-10-CM

## 2023-05-03 DIAGNOSIS — M17.11 PRIMARY OSTEOARTHRITIS OF RIGHT KNEE: ICD-10-CM

## 2023-05-03 ASSESSMENT — ENCOUNTER SYMPTOMS
COLOR CHANGE: 0
BACK PAIN: 0

## 2023-05-03 NOTE — PROGRESS NOTES
Subjective:      Patient ID: John Valverde is a 60 y.o. female. Patient seen in office today for B/L Orthrovisc#3 injections. She comes in today for a recheck of her bilateral knee pain and for her third set of bilateral Orthovisc injections. She states that she has noticed some increased improvement in her symptoms since the second and set of injections. .  She states that she does continue to have grinding both of the knees. Patient denies any new injury to the involved extremity/ joint, denies numbness or tingling in the involved extremity and denies fever or chills. Knee Pain   Pertinent negatives include no numbness. Review of Systems   Constitutional:  Negative for activity change, chills and fever. Musculoskeletal:  Positive for arthralgias. Negative for back pain, gait problem, joint swelling and myalgias. Skin:  Negative for color change, pallor, rash and wound. Neurological:  Negative for weakness and numbness. Past Medical History:   Diagnosis Date    Hypertension     Thyroid disease        Objective:   Physical Exam  Constitutional:       Appearance: She is well-developed. HENT:      Head: Normocephalic and atraumatic. Eyes:      Pupils: Pupils are equal, round, and reactive to light. Musculoskeletal:         General: Tenderness present. No deformity. Normal range of motion. Cervical back: Normal range of motion. Right hip: Normal.      Left hip: Normal.      Right knee: No swelling, deformity, effusion, erythema, ecchymosis, lacerations or bony tenderness. Normal range of motion. Tenderness present over the medial joint line. No lateral joint line, MCL or LCL tenderness. No LCL laxity or MCL laxity. Normal alignment and normal patellar mobility. Left knee: No swelling, deformity, effusion, erythema, ecchymosis, lacerations or bony tenderness. Normal range of motion. Tenderness present over the medial joint line.  No lateral joint line,

## 2023-05-03 NOTE — PATIENT INSTRUCTIONS
Continue weight-bearing as tolerated. Continue range of motion exercises as instructed. Ice and elevate as needed. Tylenol or Motrin for pain.    Gel injection #3 given today  Follow up in 6 months

## 2023-06-05 ENCOUNTER — HOSPITAL ENCOUNTER (OUTPATIENT)
Dept: MAMMOGRAPHY | Age: 58
Discharge: HOME | End: 2023-06-05
Payer: MEDICARE

## 2023-06-05 ENCOUNTER — HOSPITAL ENCOUNTER (OUTPATIENT)
Dept: ULTRASOUND IMAGING | Age: 58
Discharge: HOME | End: 2023-06-05
Payer: MEDICARE

## 2023-06-05 DIAGNOSIS — Z78.0 MENOPAUSE: ICD-10-CM

## 2023-06-05 DIAGNOSIS — E05.00 BASEDOW'S DISEASE: ICD-10-CM

## 2023-06-05 DIAGNOSIS — Z12.31 SCREENING MAMMOGRAM FOR HIGH-RISK PATIENT: ICD-10-CM

## 2023-06-05 PROCEDURE — 77063 BREAST TOMOSYNTHESIS BI: CPT

## 2023-06-05 PROCEDURE — 76536 US EXAM OF HEAD AND NECK: CPT

## 2023-06-05 PROCEDURE — 77080 DXA BONE DENSITY AXIAL: CPT

## 2023-11-01 ENCOUNTER — OFFICE VISIT (OUTPATIENT)
Dept: ORTHOPEDIC SURGERY | Age: 58
End: 2023-11-01

## 2023-11-01 VITALS
BODY MASS INDEX: 36.52 KG/M2 | RESPIRATION RATE: 13 BRPM | OXYGEN SATURATION: 95 % | HEART RATE: 86 BPM | WEIGHT: 186 LBS | HEIGHT: 60 IN

## 2023-11-01 DIAGNOSIS — M17.11 PRIMARY OSTEOARTHRITIS OF RIGHT KNEE: Primary | ICD-10-CM

## 2023-11-01 DIAGNOSIS — M17.12 PRIMARY OSTEOARTHRITIS OF LEFT KNEE: ICD-10-CM

## 2023-11-01 ASSESSMENT — ENCOUNTER SYMPTOMS
BACK PAIN: 0
COLOR CHANGE: 0

## 2023-11-15 ENCOUNTER — OFFICE VISIT (OUTPATIENT)
Dept: ORTHOPEDIC SURGERY | Age: 58
End: 2023-11-15

## 2023-11-15 VITALS
RESPIRATION RATE: 15 BRPM | HEART RATE: 63 BPM | BODY MASS INDEX: 36.52 KG/M2 | WEIGHT: 186 LBS | OXYGEN SATURATION: 95 % | HEIGHT: 60 IN

## 2023-11-15 DIAGNOSIS — M17.11 PRIMARY OSTEOARTHRITIS OF RIGHT KNEE: ICD-10-CM

## 2023-11-15 DIAGNOSIS — M17.12 PRIMARY OSTEOARTHRITIS OF LEFT KNEE: Primary | ICD-10-CM

## 2023-11-15 ASSESSMENT — ENCOUNTER SYMPTOMS
COLOR CHANGE: 0
BACK PAIN: 0

## 2023-11-15 NOTE — PATIENT INSTRUCTIONS
Gel injections #2 given today. Continue weight-bearing as tolerated. Continue range of motion exercises as instructed. Ice and elevate as needed. Tylenol or Motrin for pain. Follow up for gel injections #2.

## 2023-11-15 NOTE — PROGRESS NOTES
Patient returns to the office for gel injections #2 for her bilateral knees. Pt stated she has noticed some benefit from the first 2 injections. Pt stated her pain in her knees today is about a 5/10.

## 2023-11-15 NOTE — PROGRESS NOTES
Subjective:      Patient ID: Indu Sevilla is a 60 y.o. female. Patient returns to the office for gel injections #2 for her bilateral knees. Pt stated she has noticed some benefit from the first 2 injections. Pt stated her pain in her knees today is about a 5/10. She comes in today for a recheck of her bilateral knee pain and for her second set of bilateral Orthovisc injections. She states that she has noticed significant improvement since the first set of injections. Patient denies any new injury to the involved extremity/ joint, denies numbness or tingling in the involved extremity and denies fever or chills. Knee Pain   Pertinent negatives include no numbness. Review of Systems   Constitutional:  Negative for activity change, chills and fever. Musculoskeletal:  Positive for arthralgias. Negative for back pain, gait problem, joint swelling and myalgias. Skin:  Negative for color change, pallor, rash and wound. Neurological:  Negative for weakness and numbness. Past Medical History:   Diagnosis Date    Hypertension     Thyroid disease        Objective:   Physical Exam  Constitutional:       Appearance: She is well-developed. HENT:      Head: Normocephalic and atraumatic. Eyes:      Pupils: Pupils are equal, round, and reactive to light. Musculoskeletal:         General: Tenderness present. No deformity. Normal range of motion. Cervical back: Normal range of motion. Right hip: Normal.      Left hip: Normal.      Right knee: No swelling, deformity, effusion, erythema, ecchymosis, lacerations or bony tenderness. Normal range of motion. Tenderness present over the medial joint line. No lateral joint line, MCL or LCL tenderness. No LCL laxity or MCL laxity. Normal alignment and normal patellar mobility. Left knee: No swelling, deformity, effusion, erythema, ecchymosis, lacerations or bony tenderness. Normal range of motion.  Tenderness present over

## 2023-11-22 ENCOUNTER — OFFICE VISIT (OUTPATIENT)
Dept: ORTHOPEDIC SURGERY | Age: 58
End: 2023-11-22
Payer: MEDICARE

## 2023-11-22 VITALS
HEIGHT: 60 IN | DIASTOLIC BLOOD PRESSURE: 84 MMHG | HEART RATE: 65 BPM | SYSTOLIC BLOOD PRESSURE: 122 MMHG | OXYGEN SATURATION: 95 % | BODY MASS INDEX: 36.33 KG/M2

## 2023-11-22 DIAGNOSIS — M17.12 PRIMARY OSTEOARTHRITIS OF LEFT KNEE: Primary | ICD-10-CM

## 2023-11-22 DIAGNOSIS — M17.11 PRIMARY OSTEOARTHRITIS OF RIGHT KNEE: ICD-10-CM

## 2023-11-22 PROCEDURE — 99024 POSTOP FOLLOW-UP VISIT: CPT | Performed by: ORTHOPAEDIC SURGERY

## 2023-11-22 PROCEDURE — 20610 DRAIN/INJ JOINT/BURSA W/O US: CPT | Performed by: ORTHOPAEDIC SURGERY

## 2023-11-22 RX ORDER — THYROID 60 MG/1
90 TABLET ORAL DAILY
COMMUNITY

## 2023-11-22 ASSESSMENT — ENCOUNTER SYMPTOMS
COLOR CHANGE: 0
BACK PAIN: 0

## 2023-11-22 NOTE — PATIENT INSTRUCTIONS
Continue weight-bearing as tolerated. Continue range of motion exercises as instructed. Ice and elevate as needed. Tylenol or Motrin for pain. Follow up in 6 weeks. We are committed to providing you the best care possible. If you receive a survey after visiting one of our offices, please take time to share your experience concerning your physician office visit. These surveys are confidential and no health information about you is shared. We are eager to improve for you and we are counting on your feedback to help make that happen.

## 2023-11-22 NOTE — PROGRESS NOTES
Patient seen in office today for B/L knee injections Orthovisc #3. Stated she does feel a difference in knee pain. 6/10 pain level today. No new concerns this date.
and normal patellar mobility. Skin:     General: Skin is warm and dry. Coloration: Skin is not pale. Findings: No erythema or rash. Neurological:      Mental Status: She is alert and oriented to person, place, and time. Sensory: No sensory deficit. Right knee-Skin intact with no erythema, ecchymosis or lacerations present. 0-140    Left knee-Skin intact with no erythema, ecchymosis or lacerations present. 0-140      XR KNEE LEFT (3 VIEWS)    Result Date: 4/19/2023  XRAY X-ray 3 views of the left knee reviewed by me today in the office demonstrates age appropriate bone density throughout with severe degenerative changes with medial and patellofemoral joint space collapse, moderate osteophyte formation in all 3 compartments, normal tracking of the patella, no acute osseous abnormalities. Impression: Severe degenerative changes of the left knee as above with no acute process. XR KNEE RIGHT (3 VIEWS)    Result Date: 4/19/2023  XRAY X-ray 3 views of the right knee reviewed by me today in the office demonstrates age appropriate bone density throughout with severe degenerative changes with medial and patellofemoral joint space collapse, moderate osteophyte formation in all 3 compartments, normal tracking of the patella, no acute osseous abnormalities. Impression: Severe degenerative changes of the right knee as above with no acute process. Assessment:       Right knee DJD, moderate to severe  Left knee DJD, moderate to severe      Plan:       I again discussed with her response to the second set of the Orthovisc injections for her knees. I did perform her third set of bilateral Orthovisc injections today. When the injections quit working the next step would be to consider knee replacement surgery. Continue weight-bearing as tolerated. Continue range of motion exercises as instructed. Ice and elevate as needed. Tylenol or Motrin for pain. Follow up in 6 months.   Of

## 2024-05-22 ENCOUNTER — OFFICE VISIT (OUTPATIENT)
Dept: ORTHOPEDIC SURGERY | Age: 59
End: 2024-05-22

## 2024-05-22 VITALS
HEART RATE: 89 BPM | DIASTOLIC BLOOD PRESSURE: 79 MMHG | HEIGHT: 60 IN | SYSTOLIC BLOOD PRESSURE: 112 MMHG | OXYGEN SATURATION: 94 % | WEIGHT: 170.6 LBS | BODY MASS INDEX: 33.49 KG/M2

## 2024-05-22 DIAGNOSIS — M17.11 PRIMARY OSTEOARTHRITIS OF RIGHT KNEE: ICD-10-CM

## 2024-05-22 DIAGNOSIS — M17.12 PRIMARY OSTEOARTHRITIS OF LEFT KNEE: Primary | ICD-10-CM

## 2024-05-22 RX ORDER — MAGNESIUM GLUCONATE 27 MG(500)
500 TABLET ORAL 2 TIMES DAILY
COMMUNITY

## 2024-05-22 ASSESSMENT — ENCOUNTER SYMPTOMS
BACK PAIN: 0
COLOR CHANGE: 0

## 2024-05-22 NOTE — PROGRESS NOTES
Patient seen in office today for FU B/L knee Orthovisc injections 11/22/23. X-rays taken. Still having pain but gel injections help. 5/10 pain level. Ready to start another round today.  
injected. The needle was removed and the area cleansed and dressed.     Complications: None; patient tolerated the procedure well.                DANIEL PAGE DO

## 2024-05-22 NOTE — PATIENT INSTRUCTIONS
Continue weight-bearing as tolerated.  Continue range of motion exercises as instructed.  Ice and elevate as needed.  Tylenol or Motrin for pain.  Follow up in 1 week.    We are committed to providing you the best care possible.     If you receive a survey after visiting one of our offices, please take time to share your experience concerning your physician office visit.  These surveys are confidential and no health information about you is shared.     We are eager to improve for you and we are counting on your feedback to help make that happen.

## 2024-05-29 ENCOUNTER — PROCEDURE VISIT (OUTPATIENT)
Dept: ORTHOPEDIC SURGERY | Age: 59
End: 2024-05-29
Payer: MEDICARE

## 2024-05-29 VITALS — HEART RATE: 100 BPM | OXYGEN SATURATION: 98 % | HEIGHT: 60 IN | BODY MASS INDEX: 33.38 KG/M2 | WEIGHT: 170 LBS

## 2024-05-29 DIAGNOSIS — M17.12 PRIMARY OSTEOARTHRITIS OF LEFT KNEE: Primary | ICD-10-CM

## 2024-05-29 DIAGNOSIS — M17.11 PRIMARY OSTEOARTHRITIS OF RIGHT KNEE: ICD-10-CM

## 2024-05-29 PROCEDURE — 20610 DRAIN/INJ JOINT/BURSA W/O US: CPT | Performed by: ORTHOPAEDIC SURGERY

## 2024-05-29 ASSESSMENT — ENCOUNTER SYMPTOMS
COLOR CHANGE: 0
BACK PAIN: 0

## 2024-05-29 NOTE — PATIENT INSTRUCTIONS
Continue weight-bearing as tolerated.  Continue range of motion exercises as instructed.  Ice and elevate as needed.  Tylenol or Motrin for pain.   Gel injection given today in the bilateral knee  Follow up in one week

## 2024-05-29 NOTE — PROGRESS NOTES
Patient returns to the office today for FU of the bilateral knee injection. Pt states pain today is a 2/10 she has notice some increase pain at night. Pt states the gel injections due help with her pain.

## 2024-05-29 NOTE — PROGRESS NOTES
Subjective:      Patient ID: Careml Greenberg is a 59 y.o. female.    Patient returns to the office today for FU of the bilateral knee injection. Pt states pain today is a 2/10 she has notice some increase pain at night. Pt states the gel injections due help with her pain.        She comes in today for a her second set of bilateral Orthovisc injections.  She states that she did notice some early improvement after the first set of injections.    She states that now she began to have dull, aching and grinding pain globally in both of her knees again.  She would like to proceed with another set of Orthovisc injections for her knees.    Patient denies any new injury to the involved extremity/ joint, denies numbness or tingling in the involved extremity and denies fever or chills.                Knee Pain   Pertinent negatives include no numbness.       Review of Systems   Constitutional:  Negative for activity change, chills and fever.   Musculoskeletal:  Negative for arthralgias, back pain, gait problem, joint swelling and myalgias.   Skin:  Negative for color change, pallor, rash and wound.   Neurological:  Negative for weakness and numbness.     Past Medical History:   Diagnosis Date    Hypertension     Thyroid disease        Objective:   Physical Exam  Constitutional:       Appearance: She is well-developed.   HENT:      Head: Normocephalic and atraumatic.      Nose: No congestion.   Eyes:      Pupils: Pupils are equal, round, and reactive to light.   Pulmonary:      Effort: Pulmonary effort is normal.   Musculoskeletal:         General: Tenderness present. No deformity. Normal range of motion.      Cervical back: Normal range of motion.      Right hip: Normal.      Left hip: Normal.      Right knee: Bony tenderness and crepitus present. No swelling, deformity, effusion, erythema, ecchymosis or lacerations. Normal range of motion. Tenderness present over the medial joint line, lateral joint line and patellar

## 2024-06-12 ENCOUNTER — PROCEDURE VISIT (OUTPATIENT)
Dept: ORTHOPEDIC SURGERY | Age: 59
End: 2024-06-12

## 2024-06-12 VITALS — HEIGHT: 60 IN | BODY MASS INDEX: 33.2 KG/M2 | HEART RATE: 91 BPM | OXYGEN SATURATION: 96 % | RESPIRATION RATE: 16 BRPM

## 2024-06-12 DIAGNOSIS — M17.12 PRIMARY OSTEOARTHRITIS OF LEFT KNEE: Primary | ICD-10-CM

## 2024-06-12 DIAGNOSIS — M17.11 PRIMARY OSTEOARTHRITIS OF RIGHT KNEE: ICD-10-CM

## 2024-06-12 ASSESSMENT — ENCOUNTER SYMPTOMS
BACK PAIN: 0
COLOR CHANGE: 0

## 2024-06-12 NOTE — PATIENT INSTRUCTIONS
Continue weight-bearing as tolerated.  Continue range of motion exercises as instructed.  Ice and elevate as needed.  Tylenol or Motrin for pain.  Follow up in 6 months.    We are committed to providing you the best care possible.     If you receive a survey after visiting one of our offices, please take time to share your experience concerning your physician office visit.  These surveys are confidential and no health information about you is shared.     We are eager to improve for you and we are counting on your feedback to help make that happen.

## 2024-06-12 NOTE — PROGRESS NOTES
Subjective:      Patient ID: Carmel Greenberg is a 59 y.o. female.    Patient seen in office today B/L knee Orthovisc #3. 2/10 pain level. Stated her pain has decreased.        She comes in today for a her third set of bilateral Orthovisc injections.  She states that she has noticed significant improvement since the second set of injections.    She states that now she began to have dull, aching and grinding pain globally in both of her knees again.  She would like to proceed with another set of Orthovisc injections for her knees.    Patient denies any new injury to the involved extremity/ joint, denies numbness or tingling in the involved extremity and denies fever or chills.                Knee Pain   Pertinent negatives include no numbness.       Review of Systems   Constitutional:  Negative for activity change, chills and fever.   Musculoskeletal:  Negative for arthralgias, back pain, gait problem, joint swelling and myalgias.   Skin:  Negative for color change, pallor, rash and wound.   Neurological:  Negative for weakness and numbness.     Past Medical History:   Diagnosis Date    Hypertension     Thyroid disease        Objective:   Physical Exam  Constitutional:       Appearance: She is well-developed.   HENT:      Head: Normocephalic and atraumatic.      Nose: No congestion.   Eyes:      Pupils: Pupils are equal, round, and reactive to light.   Pulmonary:      Effort: Pulmonary effort is normal.   Musculoskeletal:         General: Tenderness present. No deformity. Normal range of motion.      Cervical back: Normal range of motion.      Right hip: Normal.      Left hip: Normal.      Right knee: Bony tenderness and crepitus present. No swelling, deformity, effusion, erythema, ecchymosis or lacerations. Normal range of motion. Tenderness present over the medial joint line, lateral joint line and patellar tendon. No MCL or LCL tenderness. No LCL laxity or MCL laxity. Normal alignment and normal patellar

## 2024-06-12 NOTE — PROGRESS NOTES
Patient seen in office today B/L knee Orthovisc #3. 2/10 pain level. Stated her pain has decreased.

## 2024-10-04 PROBLEM — N73.9 PELVIC ABSCESS IN FEMALE: Status: ACTIVE | Noted: 2024-10-04

## 2024-10-04 NOTE — PROGRESS NOTES
Patient is a 59 year old female from [facility] with Dr Chawla requesting transfer. Carmel has a PMHx of diverticulitis. She has had a JANUARY and BSO in 2005. Colonoscopy 1 month ago which was reportedly fine. Over the past week she has had increasing abdominal pain with thick purulent discharge. WBC 14,000 , CT showed a 7 cm complex mass in the left hemipelvis adjacent to the rectosigmoid colon. Most concerning for an abscess, another differential could be malignancy. She has received Zosyn. Afebrile, /72, HR 96, RR 18, SpO2 95% RA. Accepted in transfer under Dr Naidu to a medical level of care with tele.

## 2024-10-05 ENCOUNTER — APPOINTMENT (OUTPATIENT)
Dept: CT IMAGING | Age: 59
End: 2024-10-05
Payer: MEDICARE

## 2024-10-05 ENCOUNTER — HOSPITAL ENCOUNTER (INPATIENT)
Age: 59
LOS: 1 days | Discharge: ANOTHER HOSPITAL | End: 2024-10-05
Payer: MEDICARE

## 2024-10-05 VITALS
HEART RATE: 84 BPM | DIASTOLIC BLOOD PRESSURE: 67 MMHG | TEMPERATURE: 98.4 F | OXYGEN SATURATION: 94 % | RESPIRATION RATE: 16 BRPM | WEIGHT: 163 LBS | BODY MASS INDEX: 32 KG/M2 | SYSTOLIC BLOOD PRESSURE: 142 MMHG | HEIGHT: 60 IN

## 2024-10-05 DIAGNOSIS — I31.39 PERICARDIAL EFFUSION: Primary | ICD-10-CM

## 2024-10-05 PROBLEM — K65.1 INTRA-ABDOMINAL ABSCESS (HCC): Status: ACTIVE | Noted: 2024-10-05

## 2024-10-05 PROBLEM — N82.4 COLOVAGINAL FISTULA: Status: ACTIVE | Noted: 2024-10-05

## 2024-10-05 PROBLEM — K57.92 ACUTE DIVERTICULITIS: Status: ACTIVE | Noted: 2024-10-05

## 2024-10-05 PROBLEM — E03.9 HYPOTHYROIDISM: Status: ACTIVE | Noted: 2024-10-05

## 2024-10-05 LAB
ANION GAP SERPL CALC-SCNC: 12 MEQ/L (ref 8–16)
BASOPHILS ABSOLUTE: 0.1 THOU/MM3 (ref 0–0.1)
BASOPHILS NFR BLD AUTO: 0.5 %
BUN SERPL-MCNC: 13 MG/DL (ref 7–22)
CALCIUM SERPL-MCNC: 8.5 MG/DL (ref 8.5–10.5)
CHLORIDE SERPL-SCNC: 101 MEQ/L (ref 98–111)
CO2 SERPL-SCNC: 25 MEQ/L (ref 23–33)
CREAT SERPL-MCNC: 0.6 MG/DL (ref 0.4–1.2)
DEPRECATED RDW RBC AUTO: 55.7 FL (ref 35–45)
EOSINOPHIL NFR BLD AUTO: 1.9 %
EOSINOPHILS ABSOLUTE: 0.2 THOU/MM3 (ref 0–0.4)
ERYTHROCYTE [DISTWIDTH] IN BLOOD BY AUTOMATED COUNT: 18.7 % (ref 11.5–14.5)
GFR SERPL CREATININE-BSD FRML MDRD: > 90 ML/MIN/1.73M2
GLUCOSE SERPL-MCNC: 99 MG/DL (ref 70–108)
HCT VFR BLD AUTO: 38.2 % (ref 37–47)
HGB BLD-MCNC: 11.9 GM/DL (ref 12–16)
IMM GRANULOCYTES # BLD AUTO: 0.05 THOU/MM3 (ref 0–0.07)
IMM GRANULOCYTES NFR BLD AUTO: 0.5 %
LACTATE SERPL-SCNC: 0.5 MMOL/L (ref 0.5–2)
LYMPHOCYTES ABSOLUTE: 1.7 THOU/MM3 (ref 1–4.8)
LYMPHOCYTES NFR BLD AUTO: 15.3 %
MCH RBC QN AUTO: 26.2 PG (ref 26–33)
MCHC RBC AUTO-ENTMCNC: 31.2 GM/DL (ref 32.2–35.5)
MCV RBC AUTO: 84.1 FL (ref 81–99)
MONOCYTES ABSOLUTE: 0.9 THOU/MM3 (ref 0.4–1.3)
MONOCYTES NFR BLD AUTO: 7.7 %
NEUTROPHILS ABSOLUTE: 8.2 THOU/MM3 (ref 1.8–7.7)
NEUTROPHILS NFR BLD AUTO: 74.1 %
NRBC BLD AUTO-RTO: 0 /100 WBC
PLATELET # BLD AUTO: 500 THOU/MM3 (ref 130–400)
PMV BLD AUTO: 9.1 FL (ref 9.4–12.4)
POTASSIUM SERPL-SCNC: 3.6 MEQ/L (ref 3.5–5.2)
RBC # BLD AUTO: 4.54 MILL/MM3 (ref 4.2–5.4)
SODIUM SERPL-SCNC: 138 MEQ/L (ref 135–145)
WBC # BLD AUTO: 11.1 THOU/MM3 (ref 4.8–10.8)

## 2024-10-05 PROCEDURE — 36415 COLL VENOUS BLD VENIPUNCTURE: CPT

## 2024-10-05 PROCEDURE — 83605 ASSAY OF LACTIC ACID: CPT

## 2024-10-05 PROCEDURE — 2580000003 HC RX 258: Performed by: PHYSICIAN ASSISTANT

## 2024-10-05 PROCEDURE — 99239 HOSP IP/OBS DSCHRG MGMT >30: CPT | Performed by: STUDENT IN AN ORGANIZED HEALTH CARE EDUCATION/TRAINING PROGRAM

## 2024-10-05 PROCEDURE — 6370000000 HC RX 637 (ALT 250 FOR IP): Performed by: PHYSICIAN ASSISTANT

## 2024-10-05 PROCEDURE — 6370000000 HC RX 637 (ALT 250 FOR IP): Performed by: STUDENT IN AN ORGANIZED HEALTH CARE EDUCATION/TRAINING PROGRAM

## 2024-10-05 PROCEDURE — 6360000002 HC RX W HCPCS: Performed by: PHYSICIAN ASSISTANT

## 2024-10-05 PROCEDURE — 85025 COMPLETE CBC W/AUTO DIFF WBC: CPT

## 2024-10-05 PROCEDURE — 80048 BASIC METABOLIC PNL TOTAL CA: CPT

## 2024-10-05 PROCEDURE — 99222 1ST HOSP IP/OBS MODERATE 55: CPT | Performed by: PHYSICIAN ASSISTANT

## 2024-10-05 PROCEDURE — 1200000003 HC TELEMETRY R&B

## 2024-10-05 PROCEDURE — 99223 1ST HOSP IP/OBS HIGH 75: CPT | Performed by: INTERNAL MEDICINE

## 2024-10-05 RX ORDER — SODIUM CHLORIDE 0.9 % (FLUSH) 0.9 %
10 SYRINGE (ML) INJECTION PRN
Status: DISCONTINUED | OUTPATIENT
Start: 2024-10-05 | End: 2024-10-05 | Stop reason: HOSPADM

## 2024-10-05 RX ORDER — NICOTINE 21 MG/24HR
1 PATCH, TRANSDERMAL 24 HOURS TRANSDERMAL DAILY
Status: DISCONTINUED | OUTPATIENT
Start: 2024-10-05 | End: 2024-10-05 | Stop reason: HOSPADM

## 2024-10-05 RX ORDER — POLYETHYLENE GLYCOL 3350 17 G/17G
17 POWDER, FOR SOLUTION ORAL DAILY PRN
Status: DISCONTINUED | OUTPATIENT
Start: 2024-10-05 | End: 2024-10-05 | Stop reason: HOSPADM

## 2024-10-05 RX ORDER — ACETAMINOPHEN 650 MG/1
650 SUPPOSITORY RECTAL EVERY 6 HOURS PRN
Status: DISCONTINUED | OUTPATIENT
Start: 2024-10-05 | End: 2024-10-05 | Stop reason: HOSPADM

## 2024-10-05 RX ORDER — MAGNESIUM SULFATE IN WATER 40 MG/ML
2000 INJECTION, SOLUTION INTRAVENOUS PRN
Status: DISCONTINUED | OUTPATIENT
Start: 2024-10-05 | End: 2024-10-05 | Stop reason: HOSPADM

## 2024-10-05 RX ORDER — NICOTINE 21 MG/24HR
1 PATCH, TRANSDERMAL 24 HOURS TRANSDERMAL DAILY
Qty: 30 PATCH | Refills: 3
Start: 2024-10-06

## 2024-10-05 RX ORDER — THYROID 60 MG/1
90 TABLET ORAL DAILY
Status: DISCONTINUED | OUTPATIENT
Start: 2024-10-05 | End: 2024-10-05 | Stop reason: HOSPADM

## 2024-10-05 RX ORDER — TRAZODONE HYDROCHLORIDE 50 MG/1
50 TABLET, FILM COATED ORAL NIGHTLY PRN
Qty: 30 TABLET | Refills: 0
Start: 2024-10-05

## 2024-10-05 RX ORDER — POTASSIUM CHLORIDE 7.45 MG/ML
10 INJECTION INTRAVENOUS PRN
Status: DISCONTINUED | OUTPATIENT
Start: 2024-10-05 | End: 2024-10-05 | Stop reason: HOSPADM

## 2024-10-05 RX ORDER — SODIUM CHLORIDE 9 MG/ML
INJECTION, SOLUTION INTRAVENOUS PRN
Status: DISCONTINUED | OUTPATIENT
Start: 2024-10-05 | End: 2024-10-05 | Stop reason: HOSPADM

## 2024-10-05 RX ORDER — POTASSIUM CHLORIDE 1500 MG/1
40 TABLET, EXTENDED RELEASE ORAL PRN
Status: DISCONTINUED | OUTPATIENT
Start: 2024-10-05 | End: 2024-10-05 | Stop reason: HOSPADM

## 2024-10-05 RX ORDER — SODIUM CHLORIDE 0.9 % (FLUSH) 0.9 %
10 SYRINGE (ML) INJECTION EVERY 12 HOURS SCHEDULED
Status: DISCONTINUED | OUTPATIENT
Start: 2024-10-05 | End: 2024-10-05 | Stop reason: HOSPADM

## 2024-10-05 RX ORDER — ENOXAPARIN SODIUM 100 MG/ML
40 INJECTION SUBCUTANEOUS DAILY
Status: DISCONTINUED | OUTPATIENT
Start: 2024-10-05 | End: 2024-10-05 | Stop reason: HOSPADM

## 2024-10-05 RX ORDER — TRAZODONE HYDROCHLORIDE 50 MG/1
50 TABLET, FILM COATED ORAL NIGHTLY PRN
Status: DISCONTINUED | OUTPATIENT
Start: 2024-10-05 | End: 2024-10-05 | Stop reason: HOSPADM

## 2024-10-05 RX ORDER — ONDANSETRON 4 MG/1
4 TABLET, ORALLY DISINTEGRATING ORAL EVERY 8 HOURS PRN
Status: DISCONTINUED | OUTPATIENT
Start: 2024-10-05 | End: 2024-10-05 | Stop reason: HOSPADM

## 2024-10-05 RX ORDER — ONDANSETRON 2 MG/ML
4 INJECTION INTRAMUSCULAR; INTRAVENOUS EVERY 6 HOURS PRN
Status: DISCONTINUED | OUTPATIENT
Start: 2024-10-05 | End: 2024-10-05 | Stop reason: HOSPADM

## 2024-10-05 RX ORDER — ACETAMINOPHEN 325 MG/1
650 TABLET ORAL EVERY 6 HOURS PRN
Status: DISCONTINUED | OUTPATIENT
Start: 2024-10-05 | End: 2024-10-05 | Stop reason: HOSPADM

## 2024-10-05 RX ADMIN — SODIUM CHLORIDE: 9 INJECTION, SOLUTION INTRAVENOUS at 05:48

## 2024-10-05 RX ADMIN — ACETAMINOPHEN 650 MG: 325 TABLET ORAL at 21:15

## 2024-10-05 RX ADMIN — PIPERACILLIN AND TAZOBACTAM 3375 MG: 3; .375 INJECTION, POWDER, FOR SOLUTION INTRAVENOUS at 05:50

## 2024-10-05 RX ADMIN — TRAZODONE HYDROCHLORIDE 50 MG: 50 TABLET ORAL at 04:21

## 2024-10-05 RX ADMIN — PIPERACILLIN AND TAZOBACTAM 3375 MG: 3; .375 INJECTION, POWDER, FOR SOLUTION INTRAVENOUS at 13:07

## 2024-10-05 ASSESSMENT — PAIN DESCRIPTION - DESCRIPTORS
DESCRIPTORS: ACHING
DESCRIPTORS: DISCOMFORT
DESCRIPTORS: DISCOMFORT;CRAMPING

## 2024-10-05 ASSESSMENT — PAIN - FUNCTIONAL ASSESSMENT
PAIN_FUNCTIONAL_ASSESSMENT: ACTIVITIES ARE NOT PREVENTED

## 2024-10-05 ASSESSMENT — PAIN DESCRIPTION - ORIENTATION
ORIENTATION: LOWER
ORIENTATION: LOWER

## 2024-10-05 ASSESSMENT — PAIN DESCRIPTION - LOCATION
LOCATION: OTHER (COMMENT)
LOCATION: ABDOMEN
LOCATION: ABDOMEN

## 2024-10-05 ASSESSMENT — PAIN SCALES - GENERAL
PAINLEVEL_OUTOF10: 2
PAINLEVEL_OUTOF10: 5
PAINLEVEL_OUTOF10: 1

## 2024-10-05 NOTE — H&P
Resp 16   SpO2 95%   General appearance: No apparent distress, appears stated age.  Eyes:  Pupils equal, round, and reactive to light. Conjunctivae/corneas clear.  HENT: Head normal in appearance. External nares normal.  Oral mucosa moist without lesions.  Hearing grossly intact.   Neck: Supple, with full range of motion. Trachea midline.  No gross JVD appreciated.  Respiratory:  Normal respiratory effort. Clear to auscultation, bilaterally without rales or wheezes or rhonchi.  Cardiovascular: Normal rate, regular rhythm with normal S1/S2 without murmurs.  No lower extremity edema.   Abdomen: tenderness to palpation over lower abdomen and pelvic area   Musculoskeletal: No joint swelling or tenderness. Normal tone. No abnormal movements.   Skin: Warm and dry. No rashes or lesions.  Neurologic:  No focal sensory/motor deficits in the upper and lower extremities. Cranial nerves:  grossly non-focal 2-12.     Psychiatric: Alert and oriented, normal insight and thought content.   Capillary Refill: Brisk,< 3 seconds.  Peripheral Pulses: +2 palpable, equal bilaterally.       Medications Prior to Admission:   Prior to Admission Medications   Prescriptions Last Dose Informant Patient Reported? Taking?   Cholecalciferol (VITAMIN D3 PO)   Yes No   Sig: Take by mouth   Melatonin 10 MG TABS   Yes No   Sig: Take by mouth   furosemide (LASIX) 10 MG/ML solution   Yes No   Sig: Take by mouth daily   Patient not taking: Reported on 6/12/2024   lisinopril-hydroCHLOROthiazide (PRINZIDE;ZESTORETIC) 20-25 MG per tablet   Yes No   Sig: Take 1 tablet by mouth daily   Patient not taking: Reported on 6/12/2024   magnesium gluconate (MAGONATE) 500 MG tablet   Yes No   Sig: Take 1 tablet by mouth 2 times daily   pantoprazole (PROTONIX) 40 MG tablet   Yes No   Sig: Take 1 tablet by mouth daily   thyroid (ARMOUR) 60 MG tablet   Yes No   Sig: Take 1.5 tablets by mouth daily      Facility-Administered Medications Last Administration Doses  Remaining   hyaluronan (ORTHOVISC) 30 MG/2ML injection 30 mg None recorded 1   hyaluronan (ORTHOVISC) 30 MG/2ML injection 30 mg None recorded 1        Allergies:  Patient has no known allergies.    Past Medical, Family, Social, Surgical Hx      Diagnosis Date    Hypertension     Thyroid disease          Procedure Laterality Date    ABDOMEN SURGERY      HYSTERECTOMY (CERVIX STATUS UNKNOWN)       No family history on file.  Social History     Socioeconomic History    Marital status:    Tobacco Use    Smoking status: Every Day     Current packs/day: 0.50     Types: Cigarettes    Smokeless tobacco: Never   Vaping Use    Vaping status: Never Used   Substance and Sexual Activity    Alcohol use: No     Comment: social    Drug use: No        Code status: Full Code     Thank you Bryson Diamond DO for the opportunity to be involved in this patient's care.    Electronically signed by KEN Milian on 10/5/2024 at 1:54 AM.

## 2024-10-05 NOTE — CONSULTS
[facility]  Cardiology Consult        Patient:  Carmel Greenberg  YOB: 1965  MRN: [id number]     Acct: [id number]    PCP: Bryson Diamond DO    Date of Admission: 10/5/2024      Reason for Consultation:  Pericardial effusion      History Of Present Illness:    59 y.o. pleasant female with history of smoking, hypertension and thyroid disease who is transferred to [facility] due to abdominal pain.  Patient has been having increasing abdominal pain over the last week and has noted some purulent vaginal/urethral discharge.  She initially went to [facility] for further evaluation.  Imaging there showed abscess at the site of rectosigmoid colon secondary to diverticulitis with suspected colovaginal fistula.  Also imaging there showed moderate pericardial effusion for which cardiology is consulted.  Patient denies any chest pain shortness of breath palpitations.      All labs, EKG's, diagnostic testing and images as well as cardiac cath, stress testing were reviewed during this encounter.    Past Medical History:          Diagnosis Date    Hypertension     Thyroid disease        Past Surgical History:          Procedure Laterality Date    ABDOMEN SURGERY      HYSTERECTOMY (CERVIX STATUS UNKNOWN)         Medications Prior to Admission:      Prior to Admission medications    Medication Sig Start Date End Date Taking? Authorizing Provider   thyroid (ARMOUR) 60 MG tablet Take 1.5 tablets by mouth daily   Yes Laurence Goldman MD   magnesium gluconate (MAGONATE) 500 MG tablet Take 1 tablet by mouth 2 times daily  Patient not taking: Reported on 10/5/2024    Laurence Goldman MD   pantoprazole (PROTONIX) 40 MG tablet Take 1 tablet by mouth daily  Patient not taking: Reported on 10/5/2024    Laurence Goldman MD   lisinopril-hydroCHLOROthiazide (PRINZIDE;ZESTORETIC) 20-25 MG per tablet Take 1 tablet by mouth daily  Patient not taking: Reported on 6/12/2024

## 2024-10-05 NOTE — CONSULTS
[place] [address]                              CONSULTATION      PATIENT NAME: NARESH MADSEN      : 1965  MED REC NO: [id number]                       ROOM: [facility]  ACCOUNT NO: [id number]                       ADMIT DATE: 10/05/2024  PROVIDER: Dariusz Prado MD      CONSULT DATE: 10/05/2024    REFERRING PHYSICIAN:  KAITLYN MORRIS    REASON FOR CONSULT:  For further evaluation of abscess seen on the CT scan.    HISTORY OF PRESENT ILLNESS:  The patient is a 59-year-old pleasant female, who had a colonoscopy by Dr. Bonilla in [facility] in 2023, which showed severe diverticulosis; hypertension, hypothyroidism, who has a vaginal discharge, was told that she had a UTI, treated with Bactrim, who presented to the [facility] because of increased lower abdominal pain and vaginal discharge.  The vaginal discharge is yellow, sometimes darker in color.  Denied any blood in the stool.  The patient had a workup including CT scan on 10/04/2024, and was reported as acute diverticulitis, 7 cm complex hypodense mass within the hemipelvis adjacent to the rectosigmoid colon concerning for abscess, and thickening of the urinary bladder with hazy margin, enlarged lymph nodes anterior to the left common iliac, bilateral adrenal adenomas, moderate pericardial effusion.  Blood workup showed WBC 14, hemoglobin 12.6, hematocrit 40.4.  Subsequently, the patient was transferred to [facility].  The patient is on antibiotics.    PAST MEDICAL HISTORY:  Hypertension, hypothyroidism.    PAST SURGICAL HISTORY:  Hysterectomy, salpingo-oophorectomy, and colonoscopy by Dr. Bonilla in 2023.    SOCIAL HISTORY:  The patient is .  Smokes 0.5 packs per day.  Denied any alcohol or illicit drug use.    FAMILY HISTORY:  No family history on file.    ALLERGIES:  NO KNOWN DRUG

## 2024-10-05 NOTE — DISCHARGE SUMMARY
results found for: \"BLOODCULT2\"    Organism:  No results found for: \"LABGRAM\"    MRSA culture only:No results found for: \"MRSAC\"    Urine culture: No results found for: \"LABURIN\"  No results found for: \"ORG\"     Respiratory culture: No results found for: \"CULTRESP\"    Aerobic and Anaerobic :  No results found for: \"LABAERO\"  No results found for: \"LABANAE\"    Urinalysis:    No results found for: \"NITRU\", \"WBCUA\", \"BACTERIA\", \"RBCUA\", \"BLOODU\", \"SPECGRAV\", \"GLUCOSEU\"    Radiology:-  CT COMPARISON OF OUTSIDE FILMS    Result Date: 10/5/2024  Radiology exam is complete. No Radiologist dictation. Please follow up with ordering provider.        Follow-up scheduled after discharge :-    None, transfer to [facility]  Consultations during this hospital stay:-  [] NONE [] Cardiology  [] Nephrology  [] Hemo onco   [x] GI   [] ID  [] Endocrine  [] Pulm    [] Neuro    [] Psych   [] Urology  [] ENT   [x] G SURGERY   []Ortho    []CV surg    [] Palliative  [] Hospice [] Pain management   []    []TCU   [] PT/OT  OTHERS:-Gynecology    Disposition: [facility]  Condition at Discharge: Stable    Time Spent:- 65 minutes    Electronically signed by Ashita Behl, MD on 10/5/24 at 7:28 PM EDT   Discharging Hospitalist

## 2024-10-05 NOTE — PROGRESS NOTES
Called report to Lucia duke at [facility] and answered all questions and concerns. LACP ETA is 7pm. Informed patient and family.

## 2024-10-05 NOTE — PROGRESS NOTES
Pt admitted to  5K5 via via direct admit from [facility].   Complaints: dark, vaginal discharge, diverticulitis.    With G.20 IV cannula on right antecubital vein, patent and intact with no signs of infection or infiltration. Vital signs obtained. Assessment and data collection initiated. Two nurse skin assessment performed by this RN and Darlene Barfield RN. Oriented to room. Policies and procedures for  explained. All questions answered with no further questions at this time. Fall prevention and safety brochure discussed with patient.  Bed alarm on. Call light in reach.Oriented to room.   Naima Tamayo, RN, RN 10/5/2024 01:40    Explained patients right to have family, representative or physician notified of their admission.  Patient has Requested for physician to be notified.  Patient has Declined for family/representative to be notified.

## 2024-10-05 NOTE — FLOWSHEET NOTE
10/05/24 0351   Treatment Team Notification   Reason for Communication Patient/Family request  (Sir, patient is asking for a medictaion to help her sleep. Thanks)   Name of Team Member Notified [facility]   Treatment Team Role Advanced Practice Nurse   Method of Communication Secure Message   Response See orders   Notification Time 0358

## 2024-10-05 NOTE — CONSULTS
Urology Consult Note      Reason for Consult:  \"diverticular abscess with fistula to bladder\"  Requesting Physician:  Dr. Behl    History Obtained From:  patient, medical record    Chief Complaint: vaginal discharge and abdominal pain    HISTORY OF PRESENT ILLNESS:                The patient is a 59 y.o. female who presented to [facility] for 3 week history of vaginal discharge that has been opaque, creamy, yellow in color and abdominal pain.  At times reports she has darker colored matter in the discharge in her peripad that looks almost like fecal material.      Treated by PCP with a course of antibiotics 3 weeks ago for a \"possible uti\". Reports she did have subjective fever and chills at that time but never checked her temperature.  Notes a hx of diverticulitis.  Continued with vaginal discharge despite the antibiotic therapy.     Denies dysuria or urgency.  Reports suprapubic discomfort.  Voiding well.  Straight cath specimen at [facility] with trace protein and few oxalate crystals but otherwise negative.  No UTI.      Ct imaging at [facility] 10/4/24 full report not available but impression as pictured below noted acute diverticulitis, 7 cm complex hypodense mass within the left hemipelvis adjacent to the rectosigmoid colon concerning for abscess, wall thickness of the urinary bladder with a hazy margin enlarged lymph node anterior to the left common iliac, bilateral adrenal adenomas, moderate pericardial effusion and borderline cardiomegaly.      WBC 14, Hgb 12.6, HCT 40.4, CRT 0.7.      Hx of salpingo-oophorectomy and hysterectomy    Past Medical History:        Diagnosis Date    Hypertension     Thyroid disease      Past Surgical History:        Procedure Laterality Date    ABDOMEN SURGERY      HYSTERECTOMY (CERVIX STATUS UNKNOWN)         Social History     Socioeconomic History    Marital status:      Spouse name: Not on file    Number of children: Not on file    Years of education: Not on file     remainder of the complete ROS is negative    PHYSICAL EXAM:  VITALS:  BP (!) 145/70   Pulse 70   Temp 97.9 °F (36.6 °C) (Oral)   Resp 18   Ht 1.52 m (4' 11.84\")   Wt 73.9 kg (163 lb)   SpO2 94%   BMI 32.00 kg/m² .  Nursing note and vitals reviewed.  Constitutional:   Alert and oriented times 3, no acute distress and cooperative to examination with appropriate mood and affect.   HEENT:   Head:         Normocephalic and atraumatic.   Mouth/Throat:          Mucous membranes are normal.   Eyes:         EOM are normal. No scleral icterus.  Nose:    The external appearance of the nose is normal  Ears:     The ears appear normal to external inspection   Neck:         Supple, symmetrical, trachea midline, no adenopathy, thyroid symmetric, not enlarged and no tenderness.   Cardiovascular:        Normal rate, regular rhythm, S1 S2 heart sounds.    Pulmonary/Chest:       Chest symmetric with normal A/P diameter, no wheezes, rales, or rhonchi noted. Normal respiratory rate and rhthym.  No use of accessory muscles.   Abdominal:          Soft. + suprapubic abdominal tenderness. Hypoactive bowel sounds.     Genitalia: External Genitalia shows no irritation or erythema   Urethral Meatus appears to be normal in size and location   Noted creamy opaque vaginal discharge  Musculoskeletal:    Normal range of motion. She exhibits no edema or tenderness of lower extremities.    Extremities:    No cyanosis, clubbing, or edema present.  Neurological:    Alert and oriented. No cranial nerve deficit. There are no focalizing motor or sensory deficits.    DATA:  CBC:   Lab Results   Component Value Date/Time    WBC 11.1 10/05/2024 02:27 AM    RBC 4.54 10/05/2024 02:27 AM    HGB 11.9 10/05/2024 02:27 AM    HCT 38.2 10/05/2024 02:27 AM    MCV 84.1 10/05/2024 02:27 AM    MCH 26.2 10/05/2024 02:27 AM    MCHC 31.2 10/05/2024 02:27 AM    RDW 15.0 11/28/2022 10:50 PM     10/05/2024 02:27 AM    MPV 9.1 10/05/2024 02:27 AM     BMP:    Lab

## 2024-10-05 NOTE — CONSULTS
OB/GYN Consult Note  [facility]    Patient Name: Carmel Greenberg     Patient : 1965  Room/Bed: [address] [place] [address]  Admission Date/Time: 10/5/2024  1:27 AM  Primary Care Physician: Bryson Diamond DO    Consulting Provider: Dr. Behl   Reason for Consult: diverticular abscess with possible fistula to vagina    CC: No chief complaint on file.               HPI: Carmel Greenberg is a 59 y.o. female  presents as a transfer from [facility] for concern for diverticular abscess. No LMP recorded. Patient has had a hysterectomy. Pt states that for the past ~4 weeks she has constant vaginal discharge with associated tenderness and vaginal itchiness. She states it all started about 4 weeks ago when she felt like her bladder was was \"heavy\" and was having increase urinary frequency where she went to her PCP and was started on Abx. She said her sxs did not get better and that's what brought her to get further checked out. She admits that her vaginal discharge does change in consistency from brown to grey to pus-like. She is wearing pads and is having to change them throughout the day 2/2 the continual leakage. Denies ever experiencing anything like this in the past.   Of note pt last had a colonoscopy Oct 25, 2023 and was told it was normal.     REVIEW OF SYSTEMS:   A minimum of an eleven point review of systems was completed.    Constitutional: negative fever, negative chills  HEENT: negative visual disturbances, negative headaches  Respiratory: negative dyspnea, negative cough  Cardiovascular: negative chest pain,  negative palpitations  Gastrointestinal: negative abdominal pain, negative RUQ pain, negative N/V, negative diarrhea, negative constipation  Genitourinary: negative dysuria, positive vaginal discharge, negative vaginal bleeding  Dermatological: negative rash, negative wounds  Hematologic: negative bleeding/clotting disorder  Immunologic: negative recent illness, negative  SpO2 94%   BMI 32.00 kg/m²                                             INPUT/OUTPUT:  I/O this shift:  In: 240 [P.O.:240]  Out: -   In: 840 [P.O.:840]  Out: 150 [Urine:150]                                                                                                                               PHYSICAL EXAM:     General Appearance: Appears healthy.  Alert; in no acute distress.  Pleasant.  Skin: Skin color, texture, turgor normal. No rashes or lesions.  Lymphatic: No abnormally enlarged lymph nodes.  Neck and EENT: normal atraumatic, no neck masses, normal thyroid, no jvd  Respiratory: Normal expansion.  Clear to auscultation.  No rales, rhonchi, or wheezing.  Cardiovascular: normal  Abdomen: mild ttp over suprapubic region, soft, non-distended, no right upper quadrant tenderness, and no CVA tenderness, no abdominal scars  Pelvic Exam:   External genitalia: no lesions/masses appreciated   Urinary system: urethral meatus normal  Vaginal: normal mucosa, thin grey/yellow discharge, normal mucosa  Induration of the vaginal cuff with mild tenderness  Adnexa: nontender and no masses  Rectal Exam: deferred   Musculoskeletal: no gross abnormalities  Extremities: non-tender BLE and non-edematous  Psych:  oriented to time, place and person, mood and affect are within normal limits, pt is a good historian; no memory problems were noted       LAB RESULTS:      No results found for: \"PREGTESTUR\", \"PREGSERUM\", \"HCG\", \"HCGQUANT\"    Lab Results   Component Value Date    WBC 11.1 (H) 10/05/2024    HGB 11.9 (L) 10/05/2024    HCT 38.2 10/05/2024    MCV 84.1 10/05/2024     (H) 10/05/2024       Lab Results   Component Value Date/Time     10/05/2024 02:27 AM    K 3.6 10/05/2024 02:27 AM     10/05/2024 02:27 AM    CO2 25 10/05/2024 02:27 AM    BUN 13 10/05/2024 02:27 AM    CREATININE 0.6 10/05/2024 02:27 AM    GLUCOSE 99 10/05/2024 02:27 AM    CALCIUM 8.5 10/05/2024 02:27 AM        DIAGNOSTICS:  CT COMPARISON OF

## 2024-11-06 ENCOUNTER — HOSPITAL ENCOUNTER (OUTPATIENT)
Dept: WOUND CARE | Age: 59
Discharge: HOME | End: 2024-11-06
Attending: INTERNAL MEDICINE
Payer: MEDICARE

## 2024-11-06 VITALS
DIASTOLIC BLOOD PRESSURE: 88 MMHG | TEMPERATURE: 98.6 F | SYSTOLIC BLOOD PRESSURE: 170 MMHG | RESPIRATION RATE: 18 BRPM | HEART RATE: 70 BPM

## 2024-11-06 PROCEDURE — 99212 OFFICE O/P EST SF 10 MIN: CPT

## 2024-11-06 ASSESSMENT — PAIN SCALES - GENERAL: PAINLEVEL_OUTOF10: 4

## 2024-11-06 NOTE — DISCHARGE INSTRUCTIONS
Ileostomy Care:  Remove pouch and barrier.  Cleanse stoma and peristomal skin with warm water.   Dry thoroughly.  Apply stoma powder prn to any reddened peristomal skin.  Measure stoma and cut barrier opening to fit close around base of stoma.  Apply [place] barrier #[id number] , size 2 3/4\"  and pouch #[id number].  Empty pouch when 1/3 to 1/2 full.  Change every 3-7 days and prn.    Call [facility] stoma nurse if issues or questions arise. [phone].

## 2024-11-06 NOTE — PROGRESS NOTES
Complex level of instructions/changes:   Family/Caregiver learning/demonstration/return demonstration visit.   Pouching/discharge procedure revised/reviewed with patient/family/caregiver.      Contact with outside resources, i.e., communication with Surgeon/ PCP, home health, F.    Contact/referral to ostomy appliance supplier for new or additional products.   Review when to call [facility] or schedule a follow-up visit.   Referral to Emergency Department   Documentation in [facility] completed. []   3       Is this the Patient's First Visit with [facility] @ [facility]?  Yes    Is this Patient Established at this [facility] within the last 3 years?   Yes             Clinical Level of Care      Points  0-3  Level 1 []     Points  4-6  Level 2 [x]     Points  7-8  Level 3 []     Points  9-10  Level 4 []     Points  11-12  Level 5 []       Electronically signed by Jasvir Curiel RN on 11/6/2024 at 2:46 PM

## 2024-11-06 NOTE — CONSULTS
°C) (Tympanic)   Resp 18     Maciej Risk Score Maciej Scale Score: 22    Patient Active Problem List   Diagnosis Code    Pelvic abscess in female N73.9    Pericardial effusion I31.39    Colovaginal fistula N82.4    Acute diverticulitis K57.92    Intra-abdominal abscess (HCC) K65.1    Hypothyroidism E03.9       Assessment       Ileostomy/Jejunostomy RLQ Loop ileostomy (Active)   Stomal Appliance 2 piece;Flat;Changed 11/06/24 1330   Stoma  Assessment Red;Moist;Edema;Protrudes 11/06/24 1330   Peristomal Assessment Clean, dry & intact 11/06/24 1330   Mucocutaneous Junction Intact 11/06/24 1330   Treatment Site care;Pouch change 11/06/24 1330   Stool Appearance Loose 11/06/24 1330   Stool Color Brown 11/06/24 1330   Stool Amount Small 11/06/24 1330   Number of days: 15                     No intake or output data in the 24 hours ending 11/06/24 1447      Plan   Plan for Ostomy Care:          Pt ambulated to exam room independently. 2 daughters with pt. Had loop ileostomy placement for colovaginal fistula on 10/22/24 at [facility] per Dr. Hayder Collins. Stated is temporary for about 10 months. Has [facility] which has been assisting with pouching system changes since has been home and providing ostomy supplies. Has Medicare. Pt has not changed pouching system yet but did have an ostomy nurse see her to educate at [facility]. Daughters are also willing to assist. Pt stated has some difficulty visualizing abdomen. Currently has on 2 piece Avoca 2 1/4\" pouching system with flat barrier and mini closed end pouch with barrier extenders. Stated [facility] nurse just applied today. Stated has not had issues with leakage since at home. Educated pt and daughters on general ostomy care, anatomy and physiology, ileostomy specific dietary issues including signs/symptoms of dehydration, avoiding extended release medications, food blockage, and pouching system change/options. Gave written information on dietary issues as well. Agreeable to change

## 2024-12-11 ENCOUNTER — TELEPHONE (OUTPATIENT)
Dept: ORTHOPEDIC SURGERY | Age: 59
End: 2024-12-11

## 2024-12-11 ENCOUNTER — PROCEDURE VISIT (OUTPATIENT)
Dept: ORTHOPEDIC SURGERY | Age: 59
End: 2024-12-11

## 2024-12-11 VITALS — HEART RATE: 80 BPM | BODY MASS INDEX: 32.39 KG/M2 | WEIGHT: 165 LBS | OXYGEN SATURATION: 94 %

## 2024-12-11 DIAGNOSIS — M17.11 PRIMARY OSTEOARTHRITIS OF RIGHT KNEE: ICD-10-CM

## 2024-12-11 DIAGNOSIS — M17.12 PRIMARY OSTEOARTHRITIS OF LEFT KNEE: Primary | ICD-10-CM

## 2024-12-11 RX ORDER — LISINOPRIL AND HYDROCHLOROTHIAZIDE 20; 25 MG/1; MG/1
2 TABLET ORAL DAILY
COMMUNITY
Start: 2024-11-13

## 2024-12-11 NOTE — PROGRESS NOTES
VISCO-SUPPLEMENTATION INJECTION (Number 1)    HISTORY OF PRESENT ILLNESS (HPI):   Carmel Greenberg is a 59 y.o. year old female who is here for follow up for visco-supplementation injection number 1 for   1. Primary osteoarthritis of left knee    2. Primary osteoarthritis of right knee    .    PAST HISTORY:   Unless otherwise specified in this note, the past history is reviewed today with the patient and is as follows-    No Known Allergies    Current Outpatient Medications   Medication Sig Dispense Refill    lisinopril-hydroCHLOROthiazide (PRINZIDE;ZESTORETIC) 20-25 MG per tablet Take 2 tablets by mouth daily      thyroid (ARMOUR) 60 MG tablet Take 1.5 tablets by mouth daily       No current facility-administered medications for this visit.       PHYSICAL EXAM:   Pulse 80   Wt 74.8 kg (165 lb)   SpO2 94%   BMI 32.39 kg/m²     The bilateral knee is examined:  Inspection:  Skin is intact.  No erythema.  Palpation:  No swelling or acute tenderness.  Neuro/Vascular/Motor:  Sensation to light touch intact.  Capillary refill brisk.  No focal motor deficits.    DIAGNOSIS:     1. Primary osteoarthritis of left knee    2. Primary osteoarthritis of right knee        PROCEDURE:   Bilateral Knee Aspiration / Injection Procedure:  Multiple treatment options were discussed.  Joint injection was recommended.  Details of the procedure, potential risks, and potential benefits were discussed.  Patient's questions were answered.  Patient elected to proceed with procedure.  Medication: Orthovisc  NDC #: [id number]  Lot #: [id number]  Procedure:  Sterile technique was used as the skin over the injection site was double preped with betadine and alcohol.   Aspiration yielded 5ml of straw colored/clear fluid which was not sent to the lab for analysis.  The knee joint was then injected with the above listed medication.  A sterile bandage was placed over the injection site.  The patient tolerated the procedure well without

## 2024-12-18 ENCOUNTER — TELEPHONE (OUTPATIENT)
Dept: ORTHOPEDIC SURGERY | Age: 59
End: 2024-12-18

## 2024-12-18 ENCOUNTER — PROCEDURE VISIT (OUTPATIENT)
Dept: ORTHOPEDIC SURGERY | Age: 59
End: 2024-12-18

## 2024-12-18 VITALS — WEIGHT: 166.4 LBS | OXYGEN SATURATION: 93 % | HEART RATE: 74 BPM | BODY MASS INDEX: 32.67 KG/M2

## 2024-12-18 DIAGNOSIS — M17.11 PRIMARY OSTEOARTHRITIS OF RIGHT KNEE: ICD-10-CM

## 2024-12-18 DIAGNOSIS — M17.12 PRIMARY OSTEOARTHRITIS OF LEFT KNEE: Primary | ICD-10-CM

## 2024-12-18 NOTE — TELEPHONE ENCOUNTER
Patient is scheduled in 6 months for her next round of gel injections. Please get approval and medication.

## 2024-12-18 NOTE — PROGRESS NOTES
VISCO-SUPPLEMENTATION INJECTION (Number 2)    HISTORY OF PRESENT ILLNESS (HPI):   Carmel Greenberg is a 59 y.o. year old female who is here for follow up for visco-supplementation injection number 2 for   1. Primary osteoarthritis of left knee    2. Primary osteoarthritis of right knee    .    PAST HISTORY:   Unless otherwise specified in this note, the past history is reviewed today with the patient and is as follows-    No Known Allergies    Current Outpatient Medications   Medication Sig Dispense Refill    lisinopril-hydroCHLOROthiazide (PRINZIDE;ZESTORETIC) 20-25 MG per tablet Take 2 tablets by mouth daily      thyroid (ARMOUR) 60 MG tablet Take 2 tablets by mouth daily       No current facility-administered medications for this visit.       PHYSICAL EXAM:   Pulse 74   Wt 75.5 kg (166 lb 6.4 oz)   SpO2 93%   BMI 32.67 kg/m²     The Bilateral knee is examined:  Inspection:  Skin is intact.  No erythema.  Palpation:  No swelling or acute tenderness.  Neuro/Vascular/Motor:  Sensation to light touch intact.  Capillary refill brisk.  No focal motor deficits.    DIAGNOSIS:     1. Primary osteoarthritis of left knee    2. Primary osteoarthritis of right knee        PROCEDURE:   Bilateral  Knee Aspiration / Injection Procedure:  Multiple treatment options were discussed.  Joint injection was recommended.  Details of the procedure, potential risks, and potential benefits were discussed.  Patient's questions were answered.  Patient elected to proceed with procedure.  Medication: Orthovisc  NDC #: [id number]  Lot #: [id number]  Procedure:  Sterile technique was used as the skin over the injection site was double preped with betadine and alcohol.   Aspiration yielded 5ml of straw colored/clear fluid which was not sent to the lab for analysis.  The knee joint was then injected with the above listed medication.  A sterile bandage was placed over the injection site.  The patient tolerated the procedure well without

## 2024-12-27 ENCOUNTER — PROCEDURE VISIT (OUTPATIENT)
Dept: ORTHOPEDIC SURGERY | Age: 59
End: 2024-12-27

## 2024-12-27 VITALS — OXYGEN SATURATION: 93 % | WEIGHT: 165 LBS | BODY MASS INDEX: 32.39 KG/M2 | HEART RATE: 63 BPM

## 2024-12-27 DIAGNOSIS — M17.12 PRIMARY OSTEOARTHRITIS OF LEFT KNEE: Primary | ICD-10-CM

## 2024-12-27 DIAGNOSIS — M17.11 PRIMARY OSTEOARTHRITIS OF RIGHT KNEE: ICD-10-CM

## 2024-12-27 NOTE — PROGRESS NOTES
VISCO-SUPPLEMENTATION INJECTION (Number 1)    HISTORY OF PRESENT ILLNESS (HPI):   Carmel Greenberg is a 59 y.o. year old female who is here for follow up for visco-supplementation injection number 1 for   1. Primary osteoarthritis of left knee    2. Primary osteoarthritis of right knee    .    PAST HISTORY:   Unless otherwise specified in this note, the past history is reviewed today with the patient and is as follows-    No Known Allergies    Current Outpatient Medications   Medication Sig Dispense Refill    lisinopril-hydroCHLOROthiazide (PRINZIDE;ZESTORETIC) 20-25 MG per tablet Take 2 tablets by mouth daily      thyroid (ARMOUR) 60 MG tablet Take 2 tablets by mouth daily       No current facility-administered medications for this visit.       PHYSICAL EXAM:   Pulse 63   Wt 74.8 kg (165 lb)   SpO2 93%   BMI 32.39 kg/m²     The Bilateral knee is examined:  Inspection:  Skin is intact.  No erythema.  Palpation:  No swelling or acute tenderness.  Neuro/Vascular/Motor:  Sensation to light touch intact.  Capillary refill brisk.  No focal motor deficits.    DIAGNOSIS:     1. Primary osteoarthritis of left knee    2. Primary osteoarthritis of right knee        PROCEDURE:   Bilateral Knee Aspiration / Injection Procedure:  Multiple treatment options were discussed.  Joint injection was recommended.  Details of the procedure, potential risks, and potential benefits were discussed.  Patient's questions were answered.  Patient elected to proceed with procedure.  Medication: Orthovisc  NDC #: [id number]  Lot #: [id number]  Procedure:  Sterile technique was used as the skin over the injection site was double preped with betadine and alcohol.   Aspiration yielded 5ml of straw colored/clear fluid which was not sent to the lab for analysis.  The knee joint was then injected with the above listed medication.  A sterile bandage was placed over the injection site.  The patient tolerated the procedure well without

## 2025-03-19 ENCOUNTER — HOSPITAL ENCOUNTER (OUTPATIENT)
Age: 60
Setting detail: SPECIMEN
Discharge: HOME | End: 2025-03-19
Payer: MEDICARE

## 2025-03-19 LAB
T3 SERPL-MCNC: 2 NG/DL (ref 80–200)
T4 FREE SERPL-MCNC: 1.3 NG/DL (ref 0.9–1.8)
TSH SERPL DL<=0.05 MIU/L-ACNC: 0.08 UIU/ML (ref 0.27–4.2)

## 2025-03-19 PROCEDURE — 84439 ASSAY OF FREE THYROXINE: CPT

## 2025-03-19 PROCEDURE — 84480 ASSAY TRIIODOTHYRONINE (T3): CPT

## 2025-03-19 PROCEDURE — 84443 ASSAY THYROID STIM HORMONE: CPT

## 2025-04-23 ENCOUNTER — HOSPITAL ENCOUNTER (OUTPATIENT)
Age: 60
Setting detail: SPECIMEN
Discharge: HOME | End: 2025-04-23
Payer: MEDICARE

## 2025-04-23 LAB
T3 SERPL-MCNC: 1 NG/DL (ref 80–200)
T4 FREE SERPL-MCNC: 1.1 NG/DL (ref 0.9–1.8)
TSH SERPL DL<=0.05 MIU/L-ACNC: 0.05 UIU/ML (ref 0.27–4.2)

## 2025-04-23 PROCEDURE — 36415 COLL VENOUS BLD VENIPUNCTURE: CPT

## 2025-04-23 PROCEDURE — 84443 ASSAY THYROID STIM HORMONE: CPT

## 2025-04-23 PROCEDURE — 84439 ASSAY OF FREE THYROXINE: CPT

## 2025-04-23 PROCEDURE — 84480 ASSAY TRIIODOTHYRONINE (T3): CPT

## 2025-05-28 ENCOUNTER — HOSPITAL ENCOUNTER (OUTPATIENT)
Age: 60
Setting detail: SPECIMEN
Discharge: HOME | End: 2025-05-28
Payer: MEDICARE

## 2025-05-28 LAB
T3FREE SERPL-MCNC: 2.33 PG/ML (ref 2.3–4.2)
T4 FREE SERPL-MCNC: 0.8 NG/DL (ref 0.9–1.8)
TSH SERPL DL<=0.05 MIU/L-ACNC: 0.69 UIU/ML (ref 0.27–4.2)

## 2025-05-28 PROCEDURE — 84439 ASSAY OF FREE THYROXINE: CPT

## 2025-05-28 PROCEDURE — 84443 ASSAY THYROID STIM HORMONE: CPT

## 2025-05-28 PROCEDURE — 84481 FREE ASSAY (FT-3): CPT

## 2025-06-17 ENCOUNTER — HOSPITAL ENCOUNTER (OUTPATIENT)
Age: 60
Setting detail: SPECIMEN
Discharge: HOME | End: 2025-06-17
Payer: MEDICARE

## 2025-06-17 LAB
BILIRUB UR QL STRIP: ABNORMAL
CLARITY UR: ABNORMAL
COLOR UR: ABNORMAL
CRYSTALS URNS MICRO: ABNORMAL /HPF
EPI CELLS #/AREA URNS HPF: 6 /HPF
GLUCOSE UR STRIP-MCNC: ABNORMAL MG/DL
HGB UR QL STRIP.AUTO: ABNORMAL
KETONES UR STRIP-MCNC: ABNORMAL MG/DL
LEUKOCYTE ESTERASE UR QL STRIP: ABNORMAL
MUCOUS THREADS URNS QL MICRO: ABNORMAL
NITRITE UR QL STRIP: ABNORMAL
PH UR STRIP: ABNORMAL [PH] (ref 5–8)
PROT UR STRIP-MCNC: ABNORMAL MG/DL
RBC #/AREA URNS HPF: 0 /HPF (ref 0–2)
SP GR UR STRIP: ABNORMAL (ref 1–1.03)
UROBILINOGEN UR STRIP-ACNC: ABNORMAL EU/DL (ref 0–1)
WBC #/AREA URNS HPF: 7 /HPF (ref 0–5)

## 2025-06-17 PROCEDURE — 87086 URINE CULTURE/COLONY COUNT: CPT

## 2025-06-17 PROCEDURE — 87077 CULTURE AEROBIC IDENTIFY: CPT

## 2025-06-17 PROCEDURE — 81001 URINALYSIS AUTO W/SCOPE: CPT

## 2025-06-20 LAB
MICROORGANISM SPEC CULT: NORMAL
SPECIMEN DESCRIPTION: NORMAL

## 2025-06-24 ENCOUNTER — APPOINTMENT (OUTPATIENT)
Dept: CT IMAGING | Age: 60
End: 2025-06-24
Payer: MEDICARE

## 2025-06-24 ENCOUNTER — HOSPITAL ENCOUNTER (EMERGENCY)
Age: 60
Discharge: HOME | End: 2025-06-24
Attending: EMERGENCY MEDICINE
Payer: MEDICARE

## 2025-06-24 VITALS
DIASTOLIC BLOOD PRESSURE: 62 MMHG | RESPIRATION RATE: 18 BRPM | HEART RATE: 58 BPM | TEMPERATURE: 98.2 F | SYSTOLIC BLOOD PRESSURE: 137 MMHG | OXYGEN SATURATION: 95 % | BODY MASS INDEX: 32.98 KG/M2 | WEIGHT: 168 LBS | HEIGHT: 60 IN

## 2025-06-24 DIAGNOSIS — R10.31 RIGHT LOWER QUADRANT ABDOMINAL PAIN: Primary | ICD-10-CM

## 2025-06-24 DIAGNOSIS — L76.32 POSTOPERATIVE HEMATOMA OF SUBCUTANEOUS TISSUE FOLLOWING NON-DERMATOLOGIC PROCEDURE: ICD-10-CM

## 2025-06-24 DIAGNOSIS — Z93.3 COLOSTOMY IN PLACE (HCC): ICD-10-CM

## 2025-06-24 LAB
ALBUMIN SERPL-MCNC: 3.8 G/DL (ref 3.4–5)
ALBUMIN/GLOB SERPL: 1.1 {RATIO}
ALP SERPL-CCNC: 80 U/L (ref 40–129)
ALT SERPL-CCNC: 14 U/L (ref 10–40)
ANION GAP SERPL CALCULATED.3IONS-SCNC: 12 MMOL/L (ref 9–17)
AST SERPL-CCNC: 24 U/L (ref 15–37)
BASOPHILS # BLD: 0.08 K/UL
BASOPHILS NFR BLD: 1 % (ref 0–1)
BILIRUB SERPL-MCNC: 0.3 MG/DL (ref 0–1)
BUN SERPL-MCNC: 13 MG/DL (ref 7–20)
CALCIUM SERPL-MCNC: 9.1 MG/DL (ref 8.3–10.6)
CHLORIDE SERPL-SCNC: 100 MMOL/L (ref 99–110)
CO2 SERPL-SCNC: 24 MMOL/L (ref 21–32)
CREAT SERPL-MCNC: 0.7 MG/DL (ref 0.6–1.2)
EOSINOPHIL # BLD: 0.17 K/UL
EOSINOPHILS RELATIVE PERCENT: 2 % (ref 0–3)
ERYTHROCYTE [DISTWIDTH] IN BLOOD BY AUTOMATED COUNT: 15.7 % (ref 11.7–14.9)
GFR, ESTIMATED: >90 ML/MIN/1.73M2
GLUCOSE SERPL-MCNC: 96 MG/DL (ref 74–99)
HCT VFR BLD AUTO: 39 % (ref 37–47)
HGB BLD-MCNC: 13 G/DL (ref 12.5–16)
IMM GRANULOCYTES # BLD AUTO: 0.03 K/UL
IMM GRANULOCYTES NFR BLD: 0 %
LIPASE SERPL-CCNC: 21 U/L (ref 13–60)
LYMPHOCYTES NFR BLD: 2.08 K/UL
LYMPHOCYTES RELATIVE PERCENT: 20 % (ref 24–44)
MAGNESIUM SERPL-MCNC: 1.9 MG/DL (ref 1.8–2.4)
MCH RBC QN AUTO: 30.2 PG (ref 27–31)
MCHC RBC AUTO-ENTMCNC: 33.3 G/DL (ref 32–36)
MCV RBC AUTO: 90.5 FL (ref 78–100)
MONOCYTES NFR BLD: 0.67 K/UL
MONOCYTES NFR BLD: 6 % (ref 0–5)
NEUTROPHILS NFR BLD: 71 % (ref 36–66)
NEUTS SEG NFR BLD: 7.44 K/UL
PLATELET # BLD AUTO: 489 K/UL (ref 140–440)
PMV BLD AUTO: 9.5 FL (ref 7.5–11.1)
POTASSIUM SERPL-SCNC: 3.4 MMOL/L (ref 3.5–5.1)
PROT SERPL-MCNC: 7.1 G/DL (ref 6.4–8.2)
RBC # BLD AUTO: 4.31 M/UL (ref 4.2–5.4)
SODIUM SERPL-SCNC: 137 MMOL/L (ref 136–145)
WBC OTHER # BLD: 10.5 K/UL (ref 4–10.5)

## 2025-06-24 PROCEDURE — 74177 CT ABD & PELVIS W/CONTRAST: CPT

## 2025-06-24 PROCEDURE — 6360000004 HC RX CONTRAST MEDICATION: Performed by: EMERGENCY MEDICINE

## 2025-06-24 PROCEDURE — 96375 TX/PRO/DX INJ NEW DRUG ADDON: CPT

## 2025-06-24 PROCEDURE — 96374 THER/PROPH/DIAG INJ IV PUSH: CPT

## 2025-06-24 PROCEDURE — 83690 ASSAY OF LIPASE: CPT

## 2025-06-24 PROCEDURE — 83735 ASSAY OF MAGNESIUM: CPT

## 2025-06-24 PROCEDURE — 99285 EMERGENCY DEPT VISIT HI MDM: CPT

## 2025-06-24 PROCEDURE — 85025 COMPLETE CBC W/AUTO DIFF WBC: CPT

## 2025-06-24 PROCEDURE — 6360000002 HC RX W HCPCS: Performed by: EMERGENCY MEDICINE

## 2025-06-24 PROCEDURE — 80053 COMPREHEN METABOLIC PANEL: CPT

## 2025-06-24 RX ORDER — ONDANSETRON 2 MG/ML
4 INJECTION INTRAMUSCULAR; INTRAVENOUS EVERY 30 MIN PRN
Status: DISCONTINUED | OUTPATIENT
Start: 2025-06-24 | End: 2025-06-24 | Stop reason: HOSPADM

## 2025-06-24 RX ORDER — HYDROCHLOROTHIAZIDE 25 MG/1
25 TABLET ORAL DAILY
COMMUNITY

## 2025-06-24 RX ORDER — IOPAMIDOL 755 MG/ML
75 INJECTION, SOLUTION INTRAVASCULAR
Status: COMPLETED | OUTPATIENT
Start: 2025-06-24 | End: 2025-06-24

## 2025-06-24 RX ORDER — GABAPENTIN 100 MG/1
100 CAPSULE ORAL EVERY 8 HOURS
COMMUNITY
Start: 2025-06-12

## 2025-06-24 RX ORDER — OXYCODONE HYDROCHLORIDE 5 MG/1
5 TABLET ORAL EVERY 6 HOURS PRN
COMMUNITY
Start: 2025-06-12

## 2025-06-24 RX ORDER — MORPHINE SULFATE 4 MG/ML
4 INJECTION, SOLUTION INTRAMUSCULAR; INTRAVENOUS EVERY 30 MIN PRN
Status: DISCONTINUED | OUTPATIENT
Start: 2025-06-24 | End: 2025-06-24 | Stop reason: HOSPADM

## 2025-06-24 RX ADMIN — IOPAMIDOL 75 ML: 755 INJECTION, SOLUTION INTRAVENOUS at 17:29

## 2025-06-24 RX ADMIN — MORPHINE SULFATE 4 MG: 4 INJECTION INTRAVENOUS at 16:07

## 2025-06-24 RX ADMIN — ONDANSETRON 4 MG: 2 INJECTION, SOLUTION INTRAMUSCULAR; INTRAVENOUS at 16:07

## 2025-06-24 ASSESSMENT — PAIN DESCRIPTION - PAIN TYPE
TYPE: ACUTE PAIN
TYPE: ACUTE PAIN

## 2025-06-24 ASSESSMENT — LIFESTYLE VARIABLES
HOW MANY STANDARD DRINKS CONTAINING ALCOHOL DO YOU HAVE ON A TYPICAL DAY: PATIENT DOES NOT DRINK
HOW OFTEN DO YOU HAVE A DRINK CONTAINING ALCOHOL: NEVER

## 2025-06-24 ASSESSMENT — PAIN DESCRIPTION - LOCATION
LOCATION: ABDOMEN

## 2025-06-24 ASSESSMENT — PAIN DESCRIPTION - ONSET: ONSET: SUDDEN

## 2025-06-24 ASSESSMENT — PAIN DESCRIPTION - ORIENTATION
ORIENTATION: RIGHT;LOWER
ORIENTATION: RIGHT;MID;LOWER
ORIENTATION: RIGHT;LOWER

## 2025-06-24 ASSESSMENT — PAIN SCALES - GENERAL
PAINLEVEL_OUTOF10: 7
PAINLEVEL_OUTOF10: 5
PAINLEVEL_OUTOF10: 7
PAINLEVEL_OUTOF10: 4

## 2025-06-24 ASSESSMENT — PAIN - FUNCTIONAL ASSESSMENT
PAIN_FUNCTIONAL_ASSESSMENT: PREVENTS OR INTERFERES SOME ACTIVE ACTIVITIES AND ADLS
PAIN_FUNCTIONAL_ASSESSMENT: 0-10
PAIN_FUNCTIONAL_ASSESSMENT: ACTIVITIES ARE NOT PREVENTED

## 2025-06-24 ASSESSMENT — PAIN DESCRIPTION - FREQUENCY: FREQUENCY: CONTINUOUS

## 2025-06-24 ASSESSMENT — PAIN DESCRIPTION - DESCRIPTORS
DESCRIPTORS: DISCOMFORT
DESCRIPTORS: SHARP;DISCOMFORT
DESCRIPTORS: ACHING;DISCOMFORT

## 2025-06-24 NOTE — ED PROVIDER NOTES
06/24/25:p.m.  Carmel Greenberg was checked out to me by Dr. Segundo. Please see his/her initial documentation for details of the patient's ED presentation, physical exam and completed studies.    In brief, Carmel Greenberg is a 60 y.o. female that presents with a complaint of abd pain awaiting surgery consultation and dispo.    I have reviewed and interpreted all of the currently available lab results from this visit (if applicable):  Results for orders placed or performed during the hospital encounter of 06/24/25   CBC with Auto Differential   Result Value Ref Range    WBC 10.5 4.0 - 10.5 k/uL    RBC 4.31 4.20 - 5.40 m/uL    Hemoglobin 13.0 12.5 - 16.0 g/dL    Hematocrit 39.0 37.0 - 47.0 %    MCV 90.5 78.0 - 100.0 fL    MCH 30.2 27.0 - 31.0 pg    MCHC 33.3 32.0 - 36.0 g/dL    RDW 15.7 (H) 11.7 - 14.9 %    Platelets 489 (H) 140 - 440 k/uL    MPV 9.5 7.5 - 11.1 fL    Neutrophils % 71 (H) 36 - 66 %    Lymphocytes % 20 (L) 24 - 44 %    Monocytes % 6 (H) 0 - 5 %    Eosinophils % 2 0 - 3 %    Basophils % 1 0 - 1 %    Immature Granulocytes % 0 0 %    Neutrophils Absolute 7.44 k/uL    Lymphocytes Absolute 2.08 k/uL    Monocytes Absolute 0.67 k/uL    Eosinophils Absolute 0.17 k/uL    Basophils Absolute 0.08 k/uL    Immature Granulocytes Absolute 0.03 k/uL   Comprehensive Metabolic Panel w/ Reflex to MG   Result Value Ref Range    Sodium 137 136 - 145 mmol/L    Potassium 3.4 (L) 3.5 - 5.1 mmol/L    Chloride 100 99 - 110 mmol/L    CO2 24 21 - 32 mmol/L    Anion Gap 12 9 - 17 mmol/L    Glucose 96 74 - 99 mg/dL    BUN 13 7 - 20 mg/dL    Creatinine 0.7 0.6 - 1.2 mg/dL    Est, Glom Filt Rate >90 >60 mL/min/1.73m2    Calcium 9.1 8.3 - 10.6 mg/dL    Total Protein 7.1 6.4 - 8.2 g/dL    Albumin 3.8 3.4 - 5.0 g/dL    Albumin/Globulin Ratio 1.1     Total Bilirubin 0.3 0.0 - 1.0 mg/dL    Alkaline Phosphatase 80 40 - 129 U/L    ALT 14 10 - 40 U/L    AST 24 15 - 37 U/L   Lipase   Result Value Ref Range    Lipase 21 13 - 60 
surgery team.  [KA]      ED Course User Index  [KA] Nkechi Segundo MD        Pending call back from [facility]. DR. Cisneros coming on shift, agreeable to take call.       I am the Primary Clinician of Record.        Clinical Impression:  1. Right lower quadrant abdominal pain    2. Colostomy in place (HCC)    3. Postoperative hematoma of subcutaneous tissue following non-dermatologic procedure      Disposition referral (if applicable):  your surgeon    On 6/25/2025      Disposition medications (if applicable):  New Prescriptions    No medications on file     ED Provider Disposition Time  DISPOSITION                 Comment: Please note this report has been produced using speech recognition software and may contain errors related to that system including errors in grammar, punctuation, and spelling, as well as words and phrases that may be inappropriate.  Efforts were made to edit the dictations.        Nkechi Segundo MD  06/24/25 1005

## 2025-07-02 ENCOUNTER — OFFICE VISIT (OUTPATIENT)
Dept: ORTHOPEDIC SURGERY | Age: 60
End: 2025-07-02
Payer: MEDICARE

## 2025-07-02 VITALS — WEIGHT: 165 LBS | HEART RATE: 72 BPM | BODY MASS INDEX: 32.22 KG/M2 | OXYGEN SATURATION: 98 %

## 2025-07-02 DIAGNOSIS — M17.11 PRIMARY OSTEOARTHRITIS OF RIGHT KNEE: ICD-10-CM

## 2025-07-02 DIAGNOSIS — M17.12 PRIMARY OSTEOARTHRITIS OF LEFT KNEE: Primary | ICD-10-CM

## 2025-07-02 PROCEDURE — 20610 DRAIN/INJ JOINT/BURSA W/O US: CPT | Performed by: STUDENT IN AN ORGANIZED HEALTH CARE EDUCATION/TRAINING PROGRAM

## 2025-07-02 NOTE — PROGRESS NOTES
of straw colored/clear fluid which was not sent to the lab for analysis.  The knee joint was then injected with the above listed medication.  A sterile bandage was placed over the injection site.  The patient tolerated the procedure well without complication.  This was first done on the left knee and then repeated for the Right without complication. The patient is scheduled for the second injection in one week.

## 2025-07-02 NOTE — PATIENT INSTRUCTIONS
Continue weight-bearing as tolerated.  Continue range of motion exercises as instructed.  Ice and elevate as needed.  Tylenol or Motrin for pain.  Follow up in 1 week.

## 2025-07-09 ENCOUNTER — OFFICE VISIT (OUTPATIENT)
Dept: ORTHOPEDIC SURGERY | Age: 60
End: 2025-07-09

## 2025-07-09 VITALS — OXYGEN SATURATION: 98 % | HEART RATE: 74 BPM

## 2025-07-09 DIAGNOSIS — M17.12 PRIMARY OSTEOARTHRITIS OF LEFT KNEE: Primary | ICD-10-CM

## 2025-07-09 DIAGNOSIS — M17.11 PRIMARY OSTEOARTHRITIS OF RIGHT KNEE: ICD-10-CM

## 2025-07-09 NOTE — PROGRESS NOTES
VISCO-SUPPLEMENTATION INJECTION (Number 2)    HISTORY OF PRESENT ILLNESS (HPI):   Carmel Greenberg is a 60 y.o. year old female who is here for follow up for visco-supplementation injection number 2 for   1. Primary osteoarthritis of left knee    2. Primary osteoarthritis of right knee    .    PAST HISTORY:   Unless otherwise specified in this note, the past history is reviewed today with the patient and is as follows-    No Known Allergies    Current Outpatient Medications   Medication Sig Dispense Refill    hydroCHLOROthiazide (HYDRODIURIL) 25 MG tablet Take 1 tablet by mouth daily      gabapentin (NEURONTIN) 100 MG capsule Take 1 capsule by mouth every 8 (eight) hours. (Patient not taking: Reported on 7/2/2025)      oxyCODONE (ROXICODONE) 5 MG immediate release tablet Take 1 tablet by mouth every 6 hours as needed. (Patient not taking: Reported on 7/2/2025)      thyroid (ARMOUR) 60 MG tablet Take 2 tablets by mouth daily       No current facility-administered medications for this visit.       PHYSICAL EXAM:   Pulse 74   SpO2 98%     The left knee is examined:  Inspection:  Skin is intact.  No erythema.  Palpation:  No swelling or acute tenderness.  Neuro/Vascular/Motor:  Sensation to light touch intact.  Capillary refill brisk.  No focal motor deficits.    DIAGNOSIS:     1. Primary osteoarthritis of left knee    2. Primary osteoarthritis of right knee        PROCEDURE:   Left Knee Aspiration / Injection Procedure:  Multiple treatment options were discussed.  Joint injection was recommended.  Details of the procedure, potential risks, and potential benefits were discussed.  Patient's questions were answered.  Patient elected to proceed with procedure.  Medication: Orthovisc  NDC #:[id number]  Lot #:[id number]  Procedure:  Sterile technique was used as the skin over the injection site was double preped with betadine and alcohol.   Aspiration yielded 0ml of straw colored/clear fluid which was not sent to the

## 2025-07-16 ENCOUNTER — TELEPHONE (OUTPATIENT)
Dept: ORTHOPEDIC SURGERY | Age: 60
End: 2025-07-16

## 2025-07-16 ENCOUNTER — OFFICE VISIT (OUTPATIENT)
Dept: ORTHOPEDIC SURGERY | Age: 60
End: 2025-07-16
Payer: MEDICARE

## 2025-07-16 VITALS — BODY MASS INDEX: 33.59 KG/M2 | HEART RATE: 57 BPM | OXYGEN SATURATION: 94 % | WEIGHT: 172 LBS

## 2025-07-16 DIAGNOSIS — M17.11 PRIMARY OSTEOARTHRITIS OF RIGHT KNEE: ICD-10-CM

## 2025-07-16 DIAGNOSIS — M17.12 PRIMARY OSTEOARTHRITIS OF LEFT KNEE: Primary | ICD-10-CM

## 2025-07-16 PROCEDURE — 20610 DRAIN/INJ JOINT/BURSA W/O US: CPT | Performed by: STUDENT IN AN ORGANIZED HEALTH CARE EDUCATION/TRAINING PROGRAM

## 2025-07-16 NOTE — TELEPHONE ENCOUNTER
Pt had her last gel injection today. She would like to get set up for injections again in 6 months. Please get approval and get her scheduled.

## 2025-07-16 NOTE — PROGRESS NOTES
VISCO-SUPPLEMENTATION INJECTION (Number 3)    HISTORY OF PRESENT ILLNESS (HPI):   Carmel Greenberg is a 60 y.o. year old female who is here for follow up for visco-supplementation injection number 3 for   1. Primary osteoarthritis of left knee    2. Primary osteoarthritis of right knee    .    PAST HISTORY:   Unless otherwise specified in this note, the past history is reviewed today with the patient and is as follows-    No Known Allergies    Current Outpatient Medications   Medication Sig Dispense Refill    hydroCHLOROthiazide (HYDRODIURIL) 25 MG tablet Take 1 tablet by mouth daily      thyroid (ARMOUR) 60 MG tablet Take 2 tablets by mouth daily       No current facility-administered medications for this visit.       PHYSICAL EXAM:   Pulse 57   Wt 78 kg (172 lb)   SpO2 94%   BMI 33.59 kg/m²     The Bilateral knee is examined:  Inspection:  Skin is intact.  No erythema.  Palpation:  No swelling or acute tenderness.  Neuro/Vascular/Motor:  Sensation to light touch intact.  Capillary refill brisk.  No focal motor deficits.    DIAGNOSIS:     1. Primary osteoarthritis of left knee    2. Primary osteoarthritis of right knee        PROCEDURE:   Bilateral Knee Aspiration / Injection Procedure:  Multiple treatment options were discussed.  Joint injection was recommended.  Details of the procedure, potential risks, and potential benefits were discussed.  Patient's questions were answered.  Patient elected to proceed with procedure.  Medication: Orthovisc  NDC #: [id number]  Lot #:[id number]  Procedure:  Sterile technique was used as the skin over the injection site was double preped with betadine and alcohol.   Aspiration yielded 5ml of straw colored/clear fluid which was not sent to the lab for analysis.  The knee joint was then injected with the above listed medication.  A sterile bandage was placed over the injection site.  The patient tolerated the procedure well without complication.  This was then repeated